# Patient Record
Sex: MALE | Race: WHITE | URBAN - METROPOLITAN AREA
[De-identification: names, ages, dates, MRNs, and addresses within clinical notes are randomized per-mention and may not be internally consistent; named-entity substitution may affect disease eponyms.]

---

## 2017-12-20 ENCOUNTER — OFFICE VISIT (OUTPATIENT)
Dept: AUDIOLOGY | Facility: CLINIC | Age: 52
End: 2017-12-20

## 2017-12-20 ENCOUNTER — OFFICE VISIT (OUTPATIENT)
Dept: OTOLARYNGOLOGY | Facility: CLINIC | Age: 52
End: 2017-12-20

## 2017-12-20 VITALS
BODY MASS INDEX: 28.62 KG/M2 | SYSTOLIC BLOOD PRESSURE: 112 MMHG | DIASTOLIC BLOOD PRESSURE: 70 MMHG | WEIGHT: 235 LBS | HEIGHT: 76 IN | TEMPERATURE: 97 F

## 2017-12-20 DIAGNOSIS — H93.12 TINNITUS OF LEFT EAR: Primary | ICD-10-CM

## 2017-12-20 DIAGNOSIS — H90.3 ASYMMETRIC SNHL (SENSORINEURAL HEARING LOSS): ICD-10-CM

## 2017-12-20 DIAGNOSIS — H93.19 TINNITUS, UNSPECIFIED LATERALITY: Primary | ICD-10-CM

## 2017-12-20 PROCEDURE — 92567 TYMPANOMETRY: CPT | Performed by: AUDIOLOGIST

## 2017-12-20 PROCEDURE — 92557 COMPREHENSIVE HEARING TEST: CPT | Performed by: AUDIOLOGIST

## 2017-12-20 PROCEDURE — 99244 OFF/OP CNSLTJ NEW/EST MOD 40: CPT | Performed by: OTOLARYNGOLOGY

## 2017-12-20 PROCEDURE — 99212 OFFICE O/P EST SF 10 MIN: CPT | Performed by: OTOLARYNGOLOGY

## 2017-12-20 NOTE — PROGRESS NOTES
AUDIOGRAM     Marion Gamez was referred for testing by Charly Mora for left sided tinnitus  2/22/1965  [id number]        Otoscopic inspection: right ear no cerumen; left ear no cerumen.        Tests/Procedures  Patient was tested via  stand

## 2017-12-20 NOTE — PATIENT INSTRUCTIONS
Choices for Hearing Protection  Earplugs and earmuffs that look like wireless headphones, have a noise reduction rating, or NRR. The [facility] tells you how many decibels your protection will block. Check the [facility] and decide what protection is right for you.  Ea

## 2017-12-20 NOTE — PROGRESS NOTES
Yg Lopez is a 47year old male. Patient presents with:  Ringing In Ear: ringing in the left ear for 3 months     HISTORY OF PRESENT ILLNESS  12/20/2017  Patient presents for evaluation of tinnitus This has been going on for months.  It is left an Negative Blurred vision and vision changes. Respiratory Negative Dyspnea and wheezing. Cardio Negative Chest pain, irregular heartbeat/palpitations and syncope. GI Negative Abdominal pain and diarrhea.    Endocrine Negative Cold intolerance and heat i prescriptions on file. ASSESSMENT AND PLAN    1. Tinnitus of left ear  The pathophysiology of tinnitus was discussed with the patient at length.  Potential causes include: Aspirin use, allergy, tmj, cervical spine issues, Ménière's disease, acoustic trauma

## 2018-01-05 ENCOUNTER — OFFICE VISIT (OUTPATIENT)
Dept: PODIATRY CLINIC | Facility: CLINIC | Age: 53
End: 2018-01-05

## 2018-01-05 DIAGNOSIS — G62.9 NEUROPATHY: Primary | ICD-10-CM

## 2018-01-05 PROCEDURE — 99212 OFFICE O/P EST SF 10 MIN: CPT | Performed by: PODIATRIST

## 2018-01-05 PROCEDURE — 99213 OFFICE O/P EST LOW 20 MIN: CPT | Performed by: PODIATRIST

## 2018-01-05 NOTE — PROGRESS NOTES
HPI:    Patient ID: Brandee Fenton is a 47year old male. HPI  This pleasant 58-year-old male presents as a new patient to me although he was a previous patient of Dr. Ana Gill. His primary complaint is numbness associated with both of his feet.   It understanding of my instructions and he was referred to [facility]. ASSESSMENT/PLAN:   Neuropathy  (primary encounter diagnosis)    No orders of the defined types were placed in this encounter.       Meds This Visit:  No prescriptions requested or or

## 2025-01-11 ENCOUNTER — APPOINTMENT (OUTPATIENT)
Dept: GENERAL RADIOLOGY | Facility: HOSPITAL | Age: 60
End: 2025-01-11
Attending: STUDENT IN AN ORGANIZED HEALTH CARE EDUCATION/TRAINING PROGRAM
Payer: COMMERCIAL

## 2025-01-11 ENCOUNTER — APPOINTMENT (OUTPATIENT)
Dept: GENERAL RADIOLOGY | Facility: HOSPITAL | Age: 60
End: 2025-01-11
Attending: EMERGENCY MEDICINE
Payer: COMMERCIAL

## 2025-01-11 ENCOUNTER — APPOINTMENT (OUTPATIENT)
Dept: CT IMAGING | Facility: HOSPITAL | Age: 60
End: 2025-01-11
Attending: STUDENT IN AN ORGANIZED HEALTH CARE EDUCATION/TRAINING PROGRAM
Payer: COMMERCIAL

## 2025-01-11 ENCOUNTER — HOSPITAL ENCOUNTER (OUTPATIENT)
Facility: HOSPITAL | Age: 60
Setting detail: OBSERVATION
Discharge: HOME | End: 2025-01-14
Attending: EMERGENCY MEDICINE | Admitting: INTERNAL MEDICINE
Payer: COMMERCIAL

## 2025-01-11 DIAGNOSIS — S82.91XA LEG FRACTURE, RIGHT, CLOSED, INITIAL ENCOUNTER: Primary | ICD-10-CM

## 2025-01-11 LAB
ANTIBODY SCREEN: NEGATIVE
APTT PPP: 25.7 SECONDS (ref 23–36)
INR BLD: 1 (ref 0.8–1.2)
PROTHROMBIN TIME: 13.8 SECONDS (ref 11.6–14.8)
RH BLOOD TYPE: POSITIVE
RH BLOOD TYPE: POSITIVE

## 2025-01-11 PROCEDURE — 73590 X-RAY EXAM OF LOWER LEG: CPT | Performed by: EMERGENCY MEDICINE

## 2025-01-11 PROCEDURE — 73610 X-RAY EXAM OF ANKLE: CPT | Performed by: EMERGENCY MEDICINE

## 2025-01-11 PROCEDURE — 99223 1ST HOSP IP/OBS HIGH 75: CPT | Performed by: INTERNAL MEDICINE

## 2025-01-11 PROCEDURE — 73700 CT LOWER EXTREMITY W/O DYE: CPT | Performed by: STUDENT IN AN ORGANIZED HEALTH CARE EDUCATION/TRAINING PROGRAM

## 2025-01-11 PROCEDURE — 73562 X-RAY EXAM OF KNEE 3: CPT | Performed by: STUDENT IN AN ORGANIZED HEALTH CARE EDUCATION/TRAINING PROGRAM

## 2025-01-11 RX ORDER — HYDROCODONE BITARTRATE AND ACETAMINOPHEN 5; 325 MG/1; MG/1
1 TABLET ORAL EVERY 4 HOURS PRN
Status: DISCONTINUED | OUTPATIENT
Start: 2025-01-11 | End: 2025-01-14

## 2025-01-11 RX ORDER — HEPARIN SODIUM 5000 [USP'U]/ML
5000 INJECTION, SOLUTION INTRAVENOUS; SUBCUTANEOUS EVERY 8 HOURS SCHEDULED
Status: DISCONTINUED | OUTPATIENT
Start: 2025-01-11 | End: 2025-01-13

## 2025-01-11 RX ORDER — ACETAMINOPHEN 500 MG
500 TABLET ORAL EVERY 4 HOURS PRN
Status: DISCONTINUED | OUTPATIENT
Start: 2025-01-11 | End: 2025-01-14

## 2025-01-11 RX ORDER — HYDROCODONE BITARTRATE AND ACETAMINOPHEN 5; 325 MG/1; MG/1
1 TABLET ORAL EVERY 6 HOURS PRN
Qty: 20 TABLET | Refills: 0 | Status: SHIPPED | OUTPATIENT
Start: 2025-01-11 | End: 2025-01-16

## 2025-01-11 RX ORDER — SODIUM CHLORIDE 9 MG/ML
INJECTION, SOLUTION INTRAVENOUS CONTINUOUS
Status: DISCONTINUED | OUTPATIENT
Start: 2025-01-11 | End: 2025-01-14

## 2025-01-11 RX ORDER — MORPHINE SULFATE 4 MG/ML
4 INJECTION, SOLUTION INTRAMUSCULAR; INTRAVENOUS EVERY 2 HOUR PRN
Status: DISCONTINUED | OUTPATIENT
Start: 2025-01-11 | End: 2025-01-14

## 2025-01-11 RX ORDER — MIDAZOLAM HYDROCHLORIDE 1 MG/ML
INJECTION INTRAMUSCULAR; INTRAVENOUS
Status: COMPLETED
Start: 2025-01-11 | End: 2025-01-11

## 2025-01-11 RX ORDER — HYDROCODONE BITARTRATE AND ACETAMINOPHEN 5; 325 MG/1; MG/1
2 TABLET ORAL EVERY 4 HOURS PRN
Status: DISCONTINUED | OUTPATIENT
Start: 2025-01-11 | End: 2025-01-14

## 2025-01-11 RX ORDER — MORPHINE SULFATE 2 MG/ML
1 INJECTION, SOLUTION INTRAMUSCULAR; INTRAVENOUS EVERY 2 HOUR PRN
Status: DISCONTINUED | OUTPATIENT
Start: 2025-01-11 | End: 2025-01-14

## 2025-01-11 RX ORDER — MIDAZOLAM HYDROCHLORIDE 1 MG/ML
4 INJECTION INTRAMUSCULAR; INTRAVENOUS ONCE
Status: COMPLETED | OUTPATIENT
Start: 2025-01-11 | End: 2025-01-11

## 2025-01-11 RX ORDER — MORPHINE SULFATE 2 MG/ML
2 INJECTION, SOLUTION INTRAMUSCULAR; INTRAVENOUS EVERY 2 HOUR PRN
Status: DISCONTINUED | OUTPATIENT
Start: 2025-01-11 | End: 2025-01-14

## 2025-01-11 RX ORDER — ACETAMINOPHEN 325 MG/1
650 TABLET ORAL EVERY 4 HOURS PRN
Status: DISCONTINUED | OUTPATIENT
Start: 2025-01-11 | End: 2025-01-14

## 2025-01-11 NOTE — PLAN OF CARE
Uneventful shift. Morphine 2mg IVP given for pain relief with positive results.    Problem: Patient Centered Care  Goal: Patient preferences are identified and integrated in the patient's plan of care  Description: Interventions:  - What would you like us to know as we care for you? I live at home with my family.  - Provide timely, complete, and accurate information to patient/family  - Incorporate patient and family knowledge, values, beliefs, and cultural backgrounds into the planning and delivery of care  - Encourage patient/family to participate in care and decision-making at the level they choose  - Honor patient and family perspectives and choices  Outcome: Progressing

## 2025-01-11 NOTE — ED QUICK NOTES
Orders for admission, patient is aware of plan and ready to go upstairs. Any questions, please call ED RN angel at extension [phone].     Patient Covid vaccination status: Fully vaccinated     COVID Test Ordered in ED: None    COVID Suspicion at Admission: N/A    Running Infusions:  None    Mental Status/LOC at time of transport: AXOX4    Other pertinent information:   CIWA score: N/A   NIH score:  N/A

## 2025-01-11 NOTE — ED INITIAL ASSESSMENT (HPI)
Patient to ED via EMS s/p fall on black ice while walking his dog on the sidewalk. +deformity to RLE/ankle, +CMS intact to RLE

## 2025-01-11 NOTE — ED PROVIDER NOTES
Patient Seen in: [facility] Emergency Department    History     Chief Complaint   Patient presents with   • Fall     Stated Complaint:     HPI    HPI: Shaw Altman is a 59 year old male who presents after an injury to the r lower leg  that occurred pta slipped on ice . Patient complains 10/10 pain.  Arrives by ems was given fentanyl pta  Pain better with rest, worse with movement.  no loss of strengths or sensation    History reviewed. No pertinent past medical history.    No past surgical history on file.         No family history on file.    Social History     Socioeconomic History   • Marital status:    Tobacco Use   • Smoking status: Never   • Smokeless tobacco: Never   Substance and Sexual Activity   • Alcohol use: Yes   • Drug use: No     Social Drivers of Health     Financial Resource Strain: Low Risk  (3/19/2024)    Received from [facility]    Overall Financial Resource Strain (CARDIA)    • Difficulty of Paying Living Expenses: Not hard at all   Food Insecurity: No Food Insecurity (3/19/2024)    Received from [facility]    Hunger Vital Sign    • Worried About Running Out of Food in the Last Year: Never true    • Ran Out of Food in the Last Year: Never true   Transportation Needs: No Transportation Needs (3/19/2024)    Received from [facility]    PRAPARE - Transportation    • Lack of Transportation (Medical): No    • Lack of Transportation (Non-Medical): No   Housing Stability: Low Risk  (3/18/2024)    Received from [facility]    Housing Stability Vital Sign    • Unable to Pay for Housing in the Last Year: No    • Number of Places Lived in the Last Year: 1    • Unstable Housing in the Last Year: No       Review of Systems    Positive for stated complaint:   Other systems are as noted in HPI.  Constitutional and vital signs reviewed.      All other systems reviewed and negative except as noted  above.    PSFH elements reviewed from today and agreed except as otherwise stated in HPI.    Physical Exam     ED Triage Vitals   BP 01/11/25 0826 (!) 197/111   Pulse 01/11/25 0822 58   Resp 01/11/25 0822 16   Temp 01/11/25 0822 97 °F (36.1 °C)   Temp src 01/11/25 0822 Temporal   SpO2 01/11/25 0822 98 %   O2 Device 01/11/25 0822 None (Room air)       Current:BP (!) 143/94   Pulse 53   Temp 97 °F (36.1 °C) (Temporal)   Resp 15   Ht 193 cm (6' 4\")   Wt 108.9 kg   SpO2 95%   BMI 29.21 kg/m²         Physical Exam      MENTAL STATUS: Alert, oriented, and cooperative. No focal deficit  HEAD: Atraumatic  NECK: Supple, full range of motion without pain or paresthesias  EXTREMITIES: r lower leg with distal lower leg deformity with lat angulation, distal nvs intact  .  2+ distal pulses.  NEURO:Sensation to touch is intact.  SKIN: No open wounds, no rashes.  PSYCH: Normal affect. Calm and cooperative.    Differential diagnosis to include fracture vs. Strain/sprain vs. contusion        ED Course   Labs Reviewed - No data to display  A ocl with stirrups splint was applied to the r lower leg.  After application of the splint I returned and re-examined the patient.  The splint was adequately immobilizing the joint and distal to the splint the patient's circulation and sensation was intact.    [facility]     Radiology:I reviewed xray noted ditstal tib displaced fx and prox fib fx    @No results found.    Medical Decision Making    Patient unable to tolerate pain or ambulate with crutches will admit, on call ortho consulted    Problems Addressed:  Leg fracture, right, closed, initial encounter: acute illness or injury    Amount and/or Complexity of Data Reviewed  Radiology: ordered and independent interpretation performed. Decision-making details documented in ED Course.  Discussion of management or test interpretation with external provider(s): Tylenol, motrin recommended.      Risk  OTC drugs.  Prescription drug  management.            Disposition and Plan     Clinical Impression:  1. Leg fracture, right, closed, initial encounter        Disposition:  Discharge    Follow-up:  Orlando Sevilla MD  [address] [address]  [phone]    Follow up        Medications Prescribed:  Current Discharge Medication List        START taking these medications    Details   HYDROcodone-acetaminophen 5-325 MG Oral Tab Take 1 tablet by mouth every 6 (six) hours as needed.  Qty: 20 tablet, Refills: 0    Associated Diagnoses: Leg fracture, right, closed, initial encounter

## 2025-01-11 NOTE — PLAN OF CARE
Shaw is A&Ox4 on room air . Splint in place to right lower extremity. Elevated up with pillows. On a general diet. Voiding via urinal. Plan is for possible surgery Monday. Ortho on consult.  Problem: Patient Centered Care  Goal: Patient preferences are identified and integrated in the patient's plan of care  Description: Interventions:  - What would you like us to know as we care for you?   - Provide timely, complete, and accurate information to patient/family  - Incorporate patient and family knowledge, values, beliefs, and cultural backgrounds into the planning and delivery of care  - Encourage patient/family to participate in care and decision-making at the level they choose  - Honor patient and family perspectives and choices  Outcome: Progressing

## 2025-01-11 NOTE — H&P
[facility]  part of [facility]    History and Physical     Shaw Altman Patient Status:  Emergency    1965 MRN [id number]   Location [facility] EMERGENCY DEPARTMENT Attending Salvador Medina MD   Hosp Day # 0 PCP Silvestre Lopez MD       History of Present Illness: Shaw Altman is a 59 year old male presented ot the ER after he slipped on ice. He denied any loss of consciousness or injury to any other bony prominence.   He is currently waiting to speak with ortho.   He denied any fever, chills, sob, n/v/d or any other complaints.     Past Medical History:History reviewed. No pertinent past medical history.     Past Surgical History: No past surgical history on file.    Social History:  reports that he has never smoked. He has never used smokeless tobacco. He reports current alcohol use. He reports that he does not use drugs.    Family History: No family history on file.    Allergies: Allergies[1]    Medications:  Medications Ordered Prior to Encounter[2]    Review of Systems:   A comprehensive 14 point review of systems was completed.    Pertinent positives and negatives noted in the HPI.    Physical Exam:    BP (!) 138/95   Pulse 53   Temp 97 °F (36.1 °C) (Temporal)   Resp 16   Ht 6' 4\" (1.93 m)   Wt 240 lb (108.9 kg)   SpO2 95%   BMI 29.21 kg/m²   General: No acute distress. Alert and oriented x 3.  Respiratory: Clear to auscultation bilaterally. No wheezes. No rhonchi.  Cardiovascular: S1, S2. Regular rate and rhythm. No murmurs, no rubs or gallops. Equal pulses.   Abdomen: Soft, nontender, nondistended.  Positive bowel sounds. No rebound, guarding or organomegaly.  Neurologic: No focal neurological deficits. CNII-XII grossly intact.  Extremities: RLE splint in place.      Diagnostic Data:      Labs:  No results for input(s): \"WBC\", \"HGB\", \"MCV\", \"PLT\", \"BAND\", \"INR\" in the last 168 hours.    Invalid input(s): \"LYM#\", \"MONO#\", \"BASOS#\", \"EOSIN#\"    No results  for input(s): \"GLU\", \"BUN\", \"CREATSERUM\", \"GFRAA\", \"GFRNAA\", \"CA\", \"ALB\", \"NA\", \"K\", \"CL\", \"CO2\", \"ALKPHO\", \"AST\", \"ALT\", \"BILT\", \"TP\" in the last 168 hours.    CrCl cannot be calculated (No successful lab value found.).    No results for input(s): \"PTP\", \"INR\" in the last 168 hours.    No results for input(s): \"TROP\", \"CK\" in the last 168 hours.    Imaging: Imaging data reviewed in Epic.      ASSESSMENT / PLAN:     R tibial fracture  Imaging reviewed  Ortho on consult  Appreciate recs  DVT ppx, IVF, abx and pain meds per ortho  PT/OT once cleared by ortho      Quality:  DVT Prophylaxis: Heparin s/c  CODE status:     Plan of care discussed with ED physician    Balta Gonzalez MD  1/11/2025                         The 21st Century Cures Act makes medical notes like these available to patients in the interest of transparency. Please be advised this is a medical document. Medical documents are intended to carry relevant information, facts as evident, and the clinical opinion of the practitioner. The medical note is intended as peer to peer communication and may appear blunt or direct. It is written in medical language and may contain abbreviations or verbiage that are unfamiliar.          [1]   Allergies  Allergen Reactions    Erythromycin     Tetanus Toxoids     Tetracycline    [2]   No current facility-administered medications on file prior to encounter.     No current outpatient medications on file prior to encounter.

## 2025-01-12 LAB
ANION GAP SERPL CALC-SCNC: 7 MMOL/L (ref 0–18)
BASOPHILS # BLD AUTO: 0.02 X10(3) UL (ref 0–0.2)
BASOPHILS NFR BLD AUTO: 0.3 %
BUN BLD-MCNC: 15 MG/DL (ref 9–23)
BUN/CREAT SERPL: 14.9 (ref 10–20)
CALCIUM BLD-MCNC: 9.3 MG/DL (ref 8.7–10.4)
CHLORIDE SERPL-SCNC: 104 MMOL/L (ref 98–112)
CO2 SERPL-SCNC: 28 MMOL/L (ref 21–32)
CREAT BLD-MCNC: 1.01 MG/DL
DEPRECATED RDW RBC AUTO: 41.6 FL (ref 35.1–46.3)
EGFRCR SERPLBLD CKD-EPI 2021: 85 ML/MIN/1.73M2 (ref 60–?)
EOSINOPHIL # BLD AUTO: 0.03 X10(3) UL (ref 0–0.7)
EOSINOPHIL NFR BLD AUTO: 0.4 %
ERYTHROCYTE [DISTWIDTH] IN BLOOD BY AUTOMATED COUNT: 13.2 % (ref 11–15)
GLUCOSE BLD-MCNC: 109 MG/DL (ref 70–99)
HCT VFR BLD AUTO: 42.6 %
HGB BLD-MCNC: 14.3 G/DL
IMM GRANULOCYTES # BLD AUTO: 0.02 X10(3) UL (ref 0–1)
IMM GRANULOCYTES NFR BLD: 0.3 %
LYMPHOCYTES # BLD AUTO: 1.5 X10(3) UL (ref 1–4)
LYMPHOCYTES NFR BLD AUTO: 19.9 %
MAGNESIUM SERPL-MCNC: 2.1 MG/DL (ref 1.6–2.6)
MCH RBC QN AUTO: 29.1 PG (ref 26–34)
MCHC RBC AUTO-ENTMCNC: 33.6 G/DL (ref 31–37)
MCV RBC AUTO: 86.8 FL
MONOCYTES # BLD AUTO: 0.95 X10(3) UL (ref 0.1–1)
MONOCYTES NFR BLD AUTO: 12.6 %
NEUTROPHILS # BLD AUTO: 5.03 X10 (3) UL (ref 1.5–7.7)
NEUTROPHILS # BLD AUTO: 5.03 X10(3) UL (ref 1.5–7.7)
NEUTROPHILS NFR BLD AUTO: 66.5 %
OSMOLALITY SERPL CALC.SUM OF ELEC: 289 MOSM/KG (ref 275–295)
PLATELET # BLD AUTO: 193 10(3)UL (ref 150–450)
POTASSIUM SERPL-SCNC: 4.3 MMOL/L (ref 3.5–5.1)
RBC # BLD AUTO: 4.91 X10(6)UL
SODIUM SERPL-SCNC: 139 MMOL/L (ref 136–145)
WBC # BLD AUTO: 7.6 X10(3) UL (ref 4–11)

## 2025-01-12 PROCEDURE — 99233 SBSQ HOSP IP/OBS HIGH 50: CPT | Performed by: INTERNAL MEDICINE

## 2025-01-12 PROCEDURE — 99497 ADVNCD CARE PLAN 30 MIN: CPT | Performed by: INTERNAL MEDICINE

## 2025-01-12 NOTE — PLAN OF CARE
Patient alert and oriented. R tibia/fibula fx, splint in place. Elevated. Bed rest. Ortho consulted. Norco provided as needed for pain.     Plan is surgery tomorrow. NPO at 0500. Hold AM heparin.    Problem: Patient Centered Care  Goal: Patient preferences are identified and integrated in the patient's plan of care  Description: Interventions:  - What would you like us to know as we care for you?   - Provide timely, complete, and accurate information to patient/family  - Incorporate patient and family knowledge, values, beliefs, and cultural backgrounds into the planning and delivery of care  - Encourage patient/family to participate in care and decision-making at the level they choose  - Honor patient and family perspectives and choices  Outcome: Progressing     Problem: Patient/Family Goals  Goal: Patient/Family Long Term Goal  Description: Patient's Long Term Goal: discharge     Interventions:  - follow md orders  -monitor labs  -discharge planning  -update pt and family on plan of care  - See additional Care Plan goals for specific interventions  Outcome: Progressing  Goal: Patient/Family Short Term Goal  Description: Patient's Short Term Goal: pain management    Interventions:   - follow md orders  -take pain medication as needed  -non-pharmacologic therapy  -PT/OT  - See additional Care Plan goals for specific interventions  Outcome: Progressing     Problem: PAIN - ADULT  Goal: Verbalizes/displays adequate comfort level or patient's stated pain goal  Description: INTERVENTIONS:  - Encourage pt to monitor pain and request assistance  - Assess pain using appropriate pain scale  - Administer analgesics based on type and severity of pain and evaluate response  - Implement non-pharmacological measures as appropriate and evaluate response  - Consider cultural and social influences on pain and pain management  - Manage/alleviate anxiety  - Utilize distraction and/or relaxation techniques  - Monitor for opioid side  effects  - Notify MD/LIP if interventions unsuccessful or patient reports new pain  - Anticipate increased pain with activity and pre-medicate as appropriate  Outcome: Progressing     Problem: RISK FOR INFECTION - ADULT  Goal: Absence of fever/infection during anticipated neutropenic period  Description: INTERVENTIONS  - Monitor WBC  - Administer growth factors as ordered  - Implement neutropenic guidelines  Outcome: Progressing     Problem: SAFETY ADULT - FALL  Goal: Free from fall injury  Description: INTERVENTIONS:  - Assess pt frequently for physical needs  - Identify cognitive and physical deficits and behaviors that affect risk of falls.  - [place] fall precautions as indicated by assessment.  - Educate pt/family on patient safety including physical limitations  - Instruct pt to call for assistance with activity based on assessment  - Modify environment to reduce risk of injury  - Provide assistive devices as appropriate  - Consider OT/PT consult to assist with strengthening/mobility  - Encourage toileting schedule  Outcome: Progressing     Problem: DISCHARGE PLANNING  Goal: Discharge to home or other facility with appropriate resources  Description: INTERVENTIONS:  - Identify barriers to discharge w/pt and caregiver  - Include patient/family/discharge partner in discharge planning  - Arrange for needed discharge resources and transportation as appropriate  - Identify discharge learning needs (meds, wound care, etc)  - Arrange for interpreters to assist at discharge as needed  - Consider post-discharge preferences of patient/family/discharge partner  - Complete POLST form as appropriate  - Assess patient's ability to be responsible for managing their own health  - Refer to Case Management Department for coordinating discharge planning if the patient needs post-hospital services based on physician/LIP order or complex needs related to functional status, cognitive ability or social support system  Outcome:  Progressing

## 2025-01-12 NOTE — OCCUPATIONAL THERAPY NOTE
OT order received, chart reviewed. Per chart, patient pending ortho consult for possible sx Monday 1/13. Will hold evaluation at this time pending updated POC.    Katelyn Thompson OTR/L  [facility]  #[phone]

## 2025-01-12 NOTE — CONSULTS
[facility]   ORTHOPAEDIC CONSULT NOTE    HOSPITAL: [facility]    CONSULT REQUEST BY: Dr. Gonzalez    CHIEF COMPLAINT/REASON FOR VISIT  Right lower leg pain     HISTORY OF PRESENT ILLNESS  Shaw Altman is a 59 year old male, healthy, who presents with right lower leg pain after a mechanical slip and fall on ice on 1/11/2025.. Taken to the ER at [facility], where imaging demonstrated a right tibia / fibula fracture, long leg splint applied, and an ortho consult was requested.    He experienced immediate 10/10, sharp pain in the mid shin, radiating into the ankle. No associated head trauma or trauma elsewhere. Denies pain elsewhere. No associated new onset numbness/tingling in the affected extremity.    At baseline he is a community ambulator and uses no devices to ambulate. No antecedent lower leg pain.     Works for [employer] in [employer].    Lives with wife in [place].     Review of Systems: Complete 10 point review of systems was performed and negative except for above in HPI.     PMH  Past Medical History:    Back problem        PSH  History reviewed. No pertinent surgical history.    FAMILY HISTORY  History reviewed. No pertinent family history.     SOCIAL HISTORY  Social History     Socioeconomic History    Marital status:      Spouse name: Not on file    Number of children: Not on file    Years of education: Not on file    Highest education level: Not on file   Occupational History    Not on file   Tobacco Use    Smoking status: Never    Smokeless tobacco: Never   Substance and Sexual Activity    Alcohol use: Yes    Drug use: No    Sexual activity: Not on file   Other Topics Concern    Not on file   Social History Narrative    Not on file     Social Drivers of Health     Financial Resource Strain: Low Risk  (3/19/2024)    Received from [facility]    Overall Financial Resource Strain (CARDIA)     Difficulty of Paying Living Expenses: Not hard at all   Food  Insecurity: No Food Insecurity (1/11/2025)    Food Insecurity     Food Insecurity: Never true   Transportation Needs: No Transportation Needs (1/11/2025)    Transportation Needs     Lack of Transportation: No     Car Seat: Not on file   Physical Activity: Not on file   Stress: Not on file   Social Connections: Not on file   Housing Stability: Low Risk  (1/11/2025)    Housing Stability     Housing Instability: No     Housing Instability Emergency: Not on file     Crib or Bassinette: Not on file       MEDS  Medications Ordered Prior to Encounter[1]    ALLERGIES  Allergies[2]     PHYSICAL EXAM  BMI:  Body mass index is 29.22 kg/m².  Constitutional:  The patient is alert and awake in no apparent distress.  Psychiatric: Affect appears normal. Conversational.  Lymphatic: There is no clear lymphedema in the lower extremity  Lungs:  The patient's breathing is unlabored.  Heart: Regular heart rate  Abdomen Nondistended, Nontender    Skin:  Splint over RLE uncovered anteriorly. There are no prior incisions, scars, open wounds or lesions over the right lower leg from the visualized windows through the splint wrap.     Musculoskeletal:    Moderate soft tissue swelling, compartments soft and compressible throughout the lower leg.  There is severe tenderness to palpation over the mid tibia.    Pain in the tibia is not reproduced or significant with passive stretch of the toes/ankle..     Neuro: The patient has 5/5 strength in ipsilateral EHL, and FHL.    Sensation is intact to light touch in the ipsilateral superficial peroneal, deep peroneal,and tibial nerve distributions.    Vascular exam: Toes are warm and well perfused. Capillary refill 2s.     IMAGING  I personally reviewed and interpreted the patient's imaging. Review of the knee, tibia and ankle x-rays and CT of the tibia reveal a moderately displaced spiral distal 1/3 tibial shaft fracture with mild comminution with associated proximal 1/3 spiral comminuted fibular shaft  fracture, and distally, a nondisplaced posterior malleolar distal tibia fracture, and two small avulsions of the anterolateral distal tibia ([place] fragment), and anterior fibula ([place] fragment) warranting syndesmotic stability evaluation.       ASSESSMENT:  59 year old healthy man, who slipped on ice, and presents with a displaced tibial shaft/fibular shaft fractures with associated nondisplaced posterior malleolar fracture and small avulsions of the AITFL at the ankle syndesmosis.     PLAN:  I discussed the diagnosis of the orthopaedic injury with the patient  as well as the treatment options of nonoperative vs operative management with the latter being recommended in his case. Together we decided it is best to proceed with operative stabilization of his tibial shaft fracture, posterior malleolar ankle fracture, with intraoperative stress evaluation of the ankle syndesmosis and if noted to be unstable, proceed with syndesmotic stabiliation, with the goals of pain control, increased probability of fracture healing in a more anatomic position, and addressing potentially unstable ankle injury to minimize risk of late syndesmotic failure or ankle instability and higher risks of post-traumatic ankle osteoarthritis. Risks, benefits, and alternatives including bleeding, infection, damage to surrounding structures including vessels, nerves, ligaments, and tendons, fracture, dislocation, fracture nonunion, malunion, leg length discrepancy, asymmetric leg rotation, anterior knee pain from suprapatellar nailing, and medical and anesthetic complications were discussed with the patient. Informed consent obtained.   Will plan for operative intervention on 1/13 if medically optimized.   NPO / IVF 1/13 after breakfast   Weight bearing status: Non weight bearing right LE. Elevate extremity on 3 pillows to minimize swelling  DVT chemoprophylaxis heparin subcutaneous or lovenox 40 daily perioperatively - does not need  to be held  Multimodal pain control  Medicine consult requested for perioperative optimization. Follow up recommendations.   Dispo: pending operative management.         Omar Behery, MD  [facility]          [1]   No current facility-administered medications on file prior to encounter.     No current outpatient medications on file prior to encounter.   [2]   Allergies  Allergen Reactions    Erythromycin     Tetanus Toxoids     Tetracycline

## 2025-01-12 NOTE — PHYSICAL THERAPY NOTE
PT orders received and chart reviewed. Patient admitted following a fall - imaging significant for \"Comminuted displaced distal tibial fracture. Transverse nondisplaced fracture of the posterior 3rd of tibial plafond extending into syndesmosis. Fractures of anterolateral tibia , adjacent fibula at anterior tibiofibular ligament attachments. Comminuted displaced proximal fibular diaphyseal fracture.\" Per nursing notes, plan for possible surgery on Monday, 1/13/25.     Jenna Haase, PT, DPT  [facility]  Ext: [phone]

## 2025-01-12 NOTE — PLAN OF CARE
Alert and oriented x4. Admitted for right tibia/fibula fracture. Leg elevated. Voiding in urinal. Pain control. Awaiting ortho consult, possible surgery on Monday.  Problem: Patient Centered Care  Goal: Patient preferences are identified and integrated in the patient's plan of care  Description: Interventions:  - What would you like us to know as we care for you? Home with family  - Provide timely, complete, and accurate information to patient/family  - Incorporate patient and family knowledge, values, beliefs, and cultural backgrounds into the planning and delivery of care  - Encourage patient/family to participate in care and decision-making at the level they choose  - Honor patient and family perspectives and choices  Outcome: Progressing     Problem: Patient/Family Goals  Goal: Patient/Family Long Term Goal  Description: Patient's Long Term Goal: pain management    Interventions:  - pain administration  - See additional Care Plan goals for specific interventions  Outcome: Progressing  Goal: Patient/Family Short Term Goal  Description: Patient's Short Term Goal: safe ambulation    Interventions:   -  calling for assistance out of bed  - See additional Care Plan goals for specific interventions  Outcome: Progressing     Problem: PAIN - ADULT  Goal: Verbalizes/displays adequate comfort level or patient's stated pain goal  Description: INTERVENTIONS:  - Encourage pt to monitor pain and request assistance  - Assess pain using appropriate pain scale  - Administer analgesics based on type and severity of pain and evaluate response  - Implement non-pharmacological measures as appropriate and evaluate response  - Consider cultural and social influences on pain and pain management  - Manage/alleviate anxiety  - Utilize distraction and/or relaxation techniques  - Monitor for opioid side effects  - Notify MD/LIP if interventions unsuccessful or patient reports new pain  - Anticipate increased pain with activity and  pre-medicate as appropriate  Outcome: Progressing     Problem: RISK FOR INFECTION - ADULT  Goal: Absence of fever/infection during anticipated neutropenic period  Description: INTERVENTIONS  - Monitor WBC  - Administer growth factors as ordered  - Implement neutropenic guidelines  Outcome: Progressing     Problem: SAFETY ADULT - FALL  Goal: Free from fall injury  Description: INTERVENTIONS:  - Assess pt frequently for physical needs  - Identify cognitive and physical deficits and behaviors that affect risk of falls.  - [place] fall precautions as indicated by assessment.  - Educate pt/family on patient safety including physical limitations  - Instruct pt to call for assistance with activity based on assessment  - Modify environment to reduce risk of injury  - Provide assistive devices as appropriate  - Consider OT/PT consult to assist with strengthening/mobility  - Encourage toileting schedule  Outcome: Progressing     Problem: DISCHARGE PLANNING  Goal: Discharge to home or other facility with appropriate resources  Description: INTERVENTIONS:  - Identify barriers to discharge w/pt and caregiver  - Include patient/family/discharge partner in discharge planning  - Arrange for needed discharge resources and transportation as appropriate  - Identify discharge learning needs (meds, wound care, etc)  - Arrange for interpreters to assist at discharge as needed  - Consider post-discharge preferences of patient/family/discharge partner  - Complete POLST form as appropriate  - Assess patient's ability to be responsible for managing their own health  - Refer to Case Management Department for coordinating discharge planning if the patient needs post-hospital services based on physician/LIP order or complex needs related to functional status, cognitive ability or social support system  Outcome: Progressing

## 2025-01-12 NOTE — DISCHARGE INSTRUCTIONS
DISCHARGE INSTRUCTIONS:  PLACE ICE TO SURGICAL AREA 20-30 MINUTES ON/ 20-30 MINUTES OFF. THIS IS TO HELP WITH PAIN & SWELLING.    TAKE YOUR MEDICATIONS BELOW AS PRESCRIBED BY YOUR DOCTOR. THESE HAVE BEEN SENT TO YOUR PHARMACY.    DO NOT BEAR WEIGHT  ON THE OPERATIVE EXTREMITY. YOU CAN USE A KNEE SCOOTER AFTER KNEE INCISION HEALS IN 3 WEEKS     CALL TO CONFIRM YOUR FOLLOW UP APPOINTMENT WITH DR. BEHERY TO BE SEEN IN 2-3 WEEKS POSTOP. [phone]    MEDICATIONS:    POST OP MEDICATION REGIMEN              MULTI-MODAL   PAIN REGIMEN       DRUG   FOR   FREQUENCY & DURATION   QTY   NOTES     WEANING  TIPS       Gabapentin 100mg   Nerve pain   Take 2 tabs every 8 hours for 14 days    90   No refills You may discontinue this medication prior to 14 days if you do not tolerate it.        Tylenol 500mg     Mild pain   Take 1 tab every 6 hours     90   Can purchase over-the-counter    Stop using medication if no longer having pain.      Tramadol 50mg     Moderate pain   Take 1-2 tabs every 6 hours only as needed   45 May prescribe a refill, but ideally, this should be tapered off after surgery Stop using this medication 2nd   As pain decreases over time, increase the interval between doses (6 hours to 8, then 12, then 24) to taper off the medication.      Meloxicam 15mg     Inflammation   Take 1 tab daily for 30 days     30   May refill if needed beyond 30 days   Recommend completing the 30 day supply.           BREAKTHROUGH PAIN         Oxycodone 5mg       Severe pain     Take 1 tabs every 6 hours only as needed for severe pain       40   Take at least 1 hr apart from Tramadol.    Ideally, no refill. The goal is to taper off this medication as soon as possible, as it can be addictive and have side effects.    Stop using this medication 1st if no longer having severe pain.         BLOOD THINNER     Aspirin 81mg   Blood clot prevention   Take 1 tab twice daily for 30 days     60     No refills   Complete the entire course of  your blood thinner.                      STOOL SOFTENER     Sennokot 8.6/50mg   Constipation   Take 1 tab twice daily  while on opioids     60 Refer to discharge instructions if constipation persists even after taking this medication   Stop taking if having diarrhea or loose stools.           ANTI-NAUSEA   Ondansetron 4mg   Nausea   Take 1 tab every 8 hours as needed if nauseous     20   No Refill      ANTI-ACID REFLUX / GASTRITIS   Pantoprazole 40mg   Acid reflux, gastric ulcer prophylaxis   Take 1 tab every day along with Meloxicam     60   May refill if Meloxicam refilled          DRESSING:    KEEP YOUR SPLINT CLEAN AND DRY AT ALL TIMES. DO NOT GET IT WET OR LOOSEN IT. YOU CAN SHOWER IF YOU ARE ABLE TO DO SO SAFELY WITH A COMPLETELY OCCLUSIVE CAST BAG OVER THE LEFT LEG AND NOT PUTTING YOUR WEIGHT ON THE LEG.     IF DRAINAGE IS PRESENT AT ANYTIME FROM YOUR DRESSING OR FROM YOUR INCISION CALL YOUR DOCTOR / SURGEON AS SOON AS POSSIBLE.      REASONS TO CALL YOUR DOCTOR:  TEMPERATURE .0 OR MORE  PERSISTANT NAUSEA OR VOMITTING - ESPECIALLY IF YOU ARE UNABLE TO EAT OR DRINK.  INCREASED LEVEL OF PAIN OR PAIN WHICH IS NOT CONTROLLED BY YOUR PAIN MEDICINE.  PERSISTENT DRAINAGE AT ANYTIME FROM YOUR SURGICAL AREA / INCISION.  PAIN, TENDERNESS OR SUDDEN SWELLING IN YOUR CALF REGION OF THE LOWER EXTREMITIES - THIS IS A POSSIBLE SIGN OF A BLOOD CLOT.  ANY CHANGES IN SENSATION IN YOUR BODY IN THE AREA OF YOUR SURGERY = NUMBNESS OR TINGLING.  ANY CHANGES IN CIRCULATION IN YOUR BODY IN THE AREA OF YOUR SURGERY = CHANGES  IN COLOR EITHER DARKER OR VERY PALE; OR  IF AREA FEELS COLD TO THE TOUCH AS COMPARED TO OTHER AREAS.  ANY CHANGES IN FUNCTION IN YOUR BODY IN THE AREA OF YOUR SURGERY = CHANGE IN MOVEMENT - UNABLE TO MOVE OR WIGGLE FINGERS, TOES OR FOOT OR ANY OTHER CHANGES IN NORMAL BODY FUNCTIONING.  FOR ANY OF THE ABOVE OR ANY OTHER QUESTIONS OR CONCERNS CALL YOUR DOCTOR/ SURGEON AS SOON AS POSSIBLE.    Omar Behery, MD  MPH  Orthopaedic Surgeon, Adult Hip and Knee Reconstruction  [facility] at [address], [facility] [address]  [place] [address]  Office: (P) [phone] (F) [phone]  Adminstrative Assistant: Liz Steward

## 2025-01-12 NOTE — PROGRESS NOTES
[facility]  part of [facility] Hospitalist Progress Note     Shaw Altman Patient Status:  Observation    1965 MRN [id number]   Location [facility] 4W/SW/SE Attending Balta Gonzalez MD   Hosp Day # 0 PCP Silvestre Lopez MD     Subjective:     Patient resting comfortably and in NAD> Denied any active complaints.   Pain appears to be better controlled  Plan for surgery in a.m.      Objective:    Review of Systems:   ROS completed; pertinent positive and negatives stated in subjective.      Vital signs:  Temp:  [97.8 °F (36.6 °C)-98.9 °F (37.2 °C)] 97.8 °F (36.6 °C)  Pulse:  [54-60] 54  Resp:  [15-16] 15  BP: (121-158)/(77-92) 121/77  SpO2:  [92 %-95 %] 92 %      Physical Exam:    Gen: NAD AO x3  Chest: good air entry CTABL  CVS: normal s1 and s2 RR  Abd: NABS soft NT ND  Neuro: CN 2-12 grossly intact  Ext: RLE splint       Diagnostic Data:    Labs:  Recent Labs   Lab 25  0501   WBC  --  7.6   HGB  --  14.3   MCV  --  86.8   PLT  --  193.0   INR 1.00  --        Recent Labs   Lab 25  0501   *   BUN 15   CREATSERUM 1.01   CA 9.3      K 4.3      CO2 28.0       Estimated Creatinine Clearance: 95.5 mL/min (based on SCr of 1.01 mg/dL).    Recent Labs   Lab 25   PTP 13.8   INR 1.00              Imaging: Imaging data reviewed in Epic.    Medications:    heparin  5,000 Units Subcutaneous Q8H [place]       Assessment & Plan:     R tibial fracture  Imaging reviewed  Ortho on consult  Operative management  NPO after breakfast 25  NWB RLE  Heparin s/c  PRN pain control  DVT ppx, IVF, abx and pain meds per ortho  PT/OT once cleared by ortho  Advance care planning  Full code  ~31 minutes      Plan of care discussed with patient or family at bedside.      Supplementary Documentation:     Quality:  DVT Prophylaxis: Heparin s/c  CODE status: Full       Estimated date of discharge: TBD  Discharge is dependent on: clinical stability  At  this point Mr. Altman is expected to be discharge to: home                   Balta Gonzalez MD  Hospitalist    MDM: High, I personally reviewed the available laboratories, imaging including XR. I discussed the case with RN. I ordered laboratories, studies including AM labs.  Medical decision making high, risk is high.       The 21st Century Cures Act makes medical notes like these available to patients in the interest of transparency. Please be advised this is a medical document. Medical documents are intended to carry relevant information, facts as evident, and the clinical opinion of the practitioner. The medical note is intended as peer to peer communication and may appear blunt or direct. It is written in medical language and may contain abbreviations or verbiage that are unfamiliar.

## 2025-01-13 ENCOUNTER — APPOINTMENT (OUTPATIENT)
Dept: GENERAL RADIOLOGY | Facility: HOSPITAL | Age: 60
End: 2025-01-13
Attending: STUDENT IN AN ORGANIZED HEALTH CARE EDUCATION/TRAINING PROGRAM
Payer: COMMERCIAL

## 2025-01-13 ENCOUNTER — ANESTHESIA (OUTPATIENT)
Dept: SURGERY | Facility: HOSPITAL | Age: 60
End: 2025-01-13
Payer: COMMERCIAL

## 2025-01-13 ENCOUNTER — ANESTHESIA EVENT (OUTPATIENT)
Dept: SURGERY | Facility: HOSPITAL | Age: 60
End: 2025-01-13
Payer: COMMERCIAL

## 2025-01-13 LAB
ALBUMIN SERPL-MCNC: 4 G/DL (ref 3.2–4.8)
ALBUMIN/GLOB SERPL: 1.6 {RATIO} (ref 1–2)
ALP LIVER SERPL-CCNC: 95 U/L
ALT SERPL-CCNC: 16 U/L
ANION GAP SERPL CALC-SCNC: 8 MMOL/L (ref 0–18)
AST SERPL-CCNC: 18 U/L (ref ?–34)
BASOPHILS # BLD AUTO: 0.02 X10(3) UL (ref 0–0.2)
BASOPHILS NFR BLD AUTO: 0.3 %
BILIRUB SERPL-MCNC: 0.7 MG/DL (ref 0.2–1.1)
BUN BLD-MCNC: 12 MG/DL (ref 9–23)
BUN/CREAT SERPL: 12.1 (ref 10–20)
CALCIUM BLD-MCNC: 9 MG/DL (ref 8.7–10.4)
CHLORIDE SERPL-SCNC: 104 MMOL/L (ref 98–112)
CO2 SERPL-SCNC: 28 MMOL/L (ref 21–32)
CREAT BLD-MCNC: 0.99 MG/DL
DEPRECATED RDW RBC AUTO: 41 FL (ref 35.1–46.3)
EGFRCR SERPLBLD CKD-EPI 2021: 87 ML/MIN/1.73M2 (ref 60–?)
EOSINOPHIL # BLD AUTO: 0.09 X10(3) UL (ref 0–0.7)
EOSINOPHIL NFR BLD AUTO: 1.4 %
ERYTHROCYTE [DISTWIDTH] IN BLOOD BY AUTOMATED COUNT: 12.9 % (ref 11–15)
GLOBULIN PLAS-MCNC: 2.5 G/DL (ref 2–3.5)
GLUCOSE BLD-MCNC: 107 MG/DL (ref 70–99)
HCT VFR BLD AUTO: 40.3 %
HGB BLD-MCNC: 13.7 G/DL
IMM GRANULOCYTES # BLD AUTO: 0.02 X10(3) UL (ref 0–1)
IMM GRANULOCYTES NFR BLD: 0.3 %
LYMPHOCYTES # BLD AUTO: 1.73 X10(3) UL (ref 1–4)
LYMPHOCYTES NFR BLD AUTO: 27 %
MCH RBC QN AUTO: 29.6 PG (ref 26–34)
MCHC RBC AUTO-ENTMCNC: 34 G/DL (ref 31–37)
MCV RBC AUTO: 87 FL
MONOCYTES # BLD AUTO: 0.93 X10(3) UL (ref 0.1–1)
MONOCYTES NFR BLD AUTO: 14.5 %
NEUTROPHILS # BLD AUTO: 3.61 X10 (3) UL (ref 1.5–7.7)
NEUTROPHILS # BLD AUTO: 3.61 X10(3) UL (ref 1.5–7.7)
NEUTROPHILS NFR BLD AUTO: 56.5 %
OSMOLALITY SERPL CALC.SUM OF ELEC: 290 MOSM/KG (ref 275–295)
PLATELET # BLD AUTO: 176 10(3)UL (ref 150–450)
POTASSIUM SERPL-SCNC: 4 MMOL/L (ref 3.5–5.1)
PROT SERPL-MCNC: 6.5 G/DL (ref 5.7–8.2)
RBC # BLD AUTO: 4.63 X10(6)UL
SODIUM SERPL-SCNC: 140 MMOL/L (ref 136–145)
WBC # BLD AUTO: 6.4 X10(3) UL (ref 4–11)

## 2025-01-13 PROCEDURE — 73590 X-RAY EXAM OF LOWER LEG: CPT | Performed by: STUDENT IN AN ORGANIZED HEALTH CARE EDUCATION/TRAINING PROGRAM

## 2025-01-13 PROCEDURE — 76000 FLUOROSCOPY <1 HR PHYS/QHP: CPT | Performed by: STUDENT IN AN ORGANIZED HEALTH CARE EDUCATION/TRAINING PROGRAM

## 2025-01-13 PROCEDURE — 99233 SBSQ HOSP IP/OBS HIGH 50: CPT | Performed by: INTERNAL MEDICINE

## 2025-01-13 PROCEDURE — 0QSJ36Z REPOSITION RIGHT FIBULA WITH INTRAMEDULLARY INTERNAL FIXATION DEVICE, PERCUTANEOUS APPROACH: ICD-10-PCS | Performed by: STUDENT IN AN ORGANIZED HEALTH CARE EDUCATION/TRAINING PROGRAM

## 2025-01-13 PROCEDURE — 0QSG36Z REPOSITION RIGHT TIBIA WITH INTRAMEDULLARY INTERNAL FIXATION DEVICE, PERCUTANEOUS APPROACH: ICD-10-PCS | Performed by: STUDENT IN AN ORGANIZED HEALTH CARE EDUCATION/TRAINING PROGRAM

## 2025-01-13 DEVICE — ADVANCED LOCKING SCREW: Type: IMPLANTABLE DEVICE | Site: TIBIA | Status: FUNCTIONAL

## 2025-01-13 DEVICE — CANNULATED SCREW
Type: IMPLANTABLE DEVICE | Site: TIBIA | Status: FUNCTIONAL
Brand: ASNIS

## 2025-01-13 DEVICE — LOCKING SCREW
Type: IMPLANTABLE DEVICE | Site: TIBIA | Status: FUNCTIONAL
Brand: T2 ALPHA

## 2025-01-13 DEVICE — TIBIAL NAIL
Type: IMPLANTABLE DEVICE | Site: TIBIA | Status: FUNCTIONAL
Brand: T2 ALPHA

## 2025-01-13 DEVICE — UNTHREADED GUIDE WIRE
Type: IMPLANTABLE DEVICE | Site: TIBIA
Brand: FIXOS

## 2025-01-13 RX ORDER — OXYCODONE HYDROCHLORIDE 5 MG/1
5 TABLET ORAL EVERY 4 HOURS PRN
Status: DISCONTINUED | OUTPATIENT
Start: 2025-01-13 | End: 2025-01-14

## 2025-01-13 RX ORDER — TRAMADOL HYDROCHLORIDE 50 MG/1
50 TABLET ORAL EVERY 6 HOURS SCHEDULED
Status: DISCONTINUED | OUTPATIENT
Start: 2025-01-13 | End: 2025-01-14

## 2025-01-13 RX ORDER — HYDROMORPHONE HYDROCHLORIDE 1 MG/ML
0.2 INJECTION, SOLUTION INTRAMUSCULAR; INTRAVENOUS; SUBCUTANEOUS EVERY 2 HOUR PRN
Status: DISCONTINUED | OUTPATIENT
Start: 2025-01-13 | End: 2025-01-14

## 2025-01-13 RX ORDER — ASPIRIN 81 MG/1
81 TABLET ORAL 2 TIMES DAILY
Status: DISCONTINUED | OUTPATIENT
Start: 2025-01-13 | End: 2025-01-14

## 2025-01-13 RX ORDER — MORPHINE SULFATE 4 MG/ML
2 INJECTION, SOLUTION INTRAMUSCULAR; INTRAVENOUS EVERY 10 MIN PRN
Status: DISCONTINUED | OUTPATIENT
Start: 2025-01-13 | End: 2025-01-13 | Stop reason: HOSPADM

## 2025-01-13 RX ORDER — ONDANSETRON 2 MG/ML
4 INJECTION INTRAMUSCULAR; INTRAVENOUS EVERY 6 HOURS PRN
Status: DISCONTINUED | OUTPATIENT
Start: 2025-01-13 | End: 2025-01-13 | Stop reason: HOSPADM

## 2025-01-13 RX ORDER — DOCUSATE SODIUM 100 MG/1
100 CAPSULE, LIQUID FILLED ORAL 2 TIMES DAILY
Status: DISCONTINUED | OUTPATIENT
Start: 2025-01-13 | End: 2025-01-14

## 2025-01-13 RX ORDER — PROCHLORPERAZINE EDISYLATE 5 MG/ML
5 INJECTION INTRAMUSCULAR; INTRAVENOUS EVERY 8 HOURS PRN
Status: DISCONTINUED | OUTPATIENT
Start: 2025-01-13 | End: 2025-01-14

## 2025-01-13 RX ORDER — DOXEPIN HYDROCHLORIDE 50 MG/1
1 CAPSULE ORAL DAILY
Status: DISCONTINUED | OUTPATIENT
Start: 2025-01-14 | End: 2025-01-14

## 2025-01-13 RX ORDER — ONDANSETRON 2 MG/ML
INJECTION INTRAMUSCULAR; INTRAVENOUS AS NEEDED
Status: DISCONTINUED | OUTPATIENT
Start: 2025-01-13 | End: 2025-01-13 | Stop reason: SURG

## 2025-01-13 RX ORDER — HYDROMORPHONE HYDROCHLORIDE 1 MG/ML
0.6 INJECTION, SOLUTION INTRAMUSCULAR; INTRAVENOUS; SUBCUTANEOUS EVERY 5 MIN PRN
Status: DISCONTINUED | OUTPATIENT
Start: 2025-01-13 | End: 2025-01-13 | Stop reason: HOSPADM

## 2025-01-13 RX ORDER — LIDOCAINE HYDROCHLORIDE 10 MG/ML
INJECTION, SOLUTION EPIDURAL; INFILTRATION; INTRACAUDAL; PERINEURAL AS NEEDED
Status: DISCONTINUED | OUTPATIENT
Start: 2025-01-13 | End: 2025-01-13 | Stop reason: SURG

## 2025-01-13 RX ORDER — EPHEDRINE SULFATE 50 MG/ML
INJECTION, SOLUTION INTRAVENOUS AS NEEDED
Status: DISCONTINUED | OUTPATIENT
Start: 2025-01-13 | End: 2025-01-13 | Stop reason: SURG

## 2025-01-13 RX ORDER — SODIUM CHLORIDE, SODIUM LACTATE, POTASSIUM CHLORIDE, CALCIUM CHLORIDE 600; 310; 30; 20 MG/100ML; MG/100ML; MG/100ML; MG/100ML
INJECTION, SOLUTION INTRAVENOUS CONTINUOUS
Status: DISCONTINUED | OUTPATIENT
Start: 2025-01-13 | End: 2025-01-13 | Stop reason: HOSPADM

## 2025-01-13 RX ORDER — HYDROMORPHONE HYDROCHLORIDE 1 MG/ML
0.2 INJECTION, SOLUTION INTRAMUSCULAR; INTRAVENOUS; SUBCUTANEOUS EVERY 5 MIN PRN
Status: DISCONTINUED | OUTPATIENT
Start: 2025-01-13 | End: 2025-01-13 | Stop reason: HOSPADM

## 2025-01-13 RX ORDER — SODIUM PHOSPHATE, DIBASIC AND SODIUM PHOSPHATE, MONOBASIC 7; 19 G/230ML; G/230ML
1 ENEMA RECTAL ONCE AS NEEDED
Status: DISCONTINUED | OUTPATIENT
Start: 2025-01-13 | End: 2025-01-14

## 2025-01-13 RX ORDER — PROCHLORPERAZINE EDISYLATE 5 MG/ML
5 INJECTION INTRAMUSCULAR; INTRAVENOUS EVERY 8 HOURS PRN
Status: DISCONTINUED | OUTPATIENT
Start: 2025-01-13 | End: 2025-01-13 | Stop reason: HOSPADM

## 2025-01-13 RX ORDER — BISACODYL 10 MG
10 SUPPOSITORY, RECTAL RECTAL
Status: DISCONTINUED | OUTPATIENT
Start: 2025-01-13 | End: 2025-01-14

## 2025-01-13 RX ORDER — ACETAMINOPHEN 500 MG
1000 TABLET ORAL EVERY 8 HOURS SCHEDULED
Status: DISCONTINUED | OUTPATIENT
Start: 2025-01-13 | End: 2025-01-14

## 2025-01-13 RX ORDER — HYDROMORPHONE HYDROCHLORIDE 1 MG/ML
0.4 INJECTION, SOLUTION INTRAMUSCULAR; INTRAVENOUS; SUBCUTANEOUS EVERY 2 HOUR PRN
Status: DISCONTINUED | OUTPATIENT
Start: 2025-01-13 | End: 2025-01-14

## 2025-01-13 RX ORDER — MORPHINE SULFATE 10 MG/ML
6 INJECTION, SOLUTION INTRAMUSCULAR; INTRAVENOUS EVERY 10 MIN PRN
Status: DISCONTINUED | OUTPATIENT
Start: 2025-01-13 | End: 2025-01-13 | Stop reason: HOSPADM

## 2025-01-13 RX ORDER — KETOROLAC TROMETHAMINE 30 MG/ML
30 INJECTION, SOLUTION INTRAMUSCULAR; INTRAVENOUS EVERY 6 HOURS
Status: DISCONTINUED | OUTPATIENT
Start: 2025-01-13 | End: 2025-01-14

## 2025-01-13 RX ORDER — SENNOSIDES 8.6 MG
17.2 TABLET ORAL NIGHTLY
Status: DISCONTINUED | OUTPATIENT
Start: 2025-01-13 | End: 2025-01-14

## 2025-01-13 RX ORDER — PHENYLEPHRINE HCL 10 MG/ML
VIAL (ML) INJECTION AS NEEDED
Status: DISCONTINUED | OUTPATIENT
Start: 2025-01-13 | End: 2025-01-13 | Stop reason: SURG

## 2025-01-13 RX ORDER — MORPHINE SULFATE 4 MG/ML
4 INJECTION, SOLUTION INTRAMUSCULAR; INTRAVENOUS EVERY 10 MIN PRN
Status: DISCONTINUED | OUTPATIENT
Start: 2025-01-13 | End: 2025-01-13 | Stop reason: HOSPADM

## 2025-01-13 RX ORDER — OXYCODONE HYDROCHLORIDE 5 MG/1
2.5 TABLET ORAL EVERY 4 HOURS PRN
Status: DISCONTINUED | OUTPATIENT
Start: 2025-01-13 | End: 2025-01-14

## 2025-01-13 RX ORDER — NALOXONE HYDROCHLORIDE 0.4 MG/ML
80 INJECTION, SOLUTION INTRAMUSCULAR; INTRAVENOUS; SUBCUTANEOUS AS NEEDED
Status: DISCONTINUED | OUTPATIENT
Start: 2025-01-13 | End: 2025-01-13 | Stop reason: HOSPADM

## 2025-01-13 RX ORDER — ACETAMINOPHEN 500 MG
1000 TABLET ORAL ONCE AS NEEDED
Status: DISCONTINUED | OUTPATIENT
Start: 2025-01-13 | End: 2025-01-13 | Stop reason: HOSPADM

## 2025-01-13 RX ORDER — ONDANSETRON 2 MG/ML
4 INJECTION INTRAMUSCULAR; INTRAVENOUS EVERY 6 HOURS PRN
Status: DISCONTINUED | OUTPATIENT
Start: 2025-01-13 | End: 2025-01-14

## 2025-01-13 RX ORDER — DEXAMETHASONE SODIUM PHOSPHATE 4 MG/ML
VIAL (ML) INJECTION AS NEEDED
Status: DISCONTINUED | OUTPATIENT
Start: 2025-01-13 | End: 2025-01-13 | Stop reason: SURG

## 2025-01-13 RX ORDER — HYDROMORPHONE HYDROCHLORIDE 1 MG/ML
0.4 INJECTION, SOLUTION INTRAMUSCULAR; INTRAVENOUS; SUBCUTANEOUS EVERY 5 MIN PRN
Status: DISCONTINUED | OUTPATIENT
Start: 2025-01-13 | End: 2025-01-13 | Stop reason: HOSPADM

## 2025-01-13 RX ORDER — POLYETHYLENE GLYCOL 3350 17 G/17G
17 POWDER, FOR SOLUTION ORAL DAILY PRN
Status: DISCONTINUED | OUTPATIENT
Start: 2025-01-13 | End: 2025-01-14

## 2025-01-13 RX ADMIN — ONDANSETRON 4 MG: 2 INJECTION INTRAMUSCULAR; INTRAVENOUS at 17:32:00

## 2025-01-13 RX ADMIN — LIDOCAINE HYDROCHLORIDE 50 MG: 10 INJECTION, SOLUTION EPIDURAL; INFILTRATION; INTRACAUDAL; PERINEURAL at 17:32:00

## 2025-01-13 RX ADMIN — SODIUM CHLORIDE: 9 INJECTION, SOLUTION INTRAVENOUS at 17:32:00

## 2025-01-13 RX ADMIN — EPHEDRINE SULFATE 10 MG: 50 INJECTION, SOLUTION INTRAVENOUS at 17:41:00

## 2025-01-13 RX ADMIN — PHENYLEPHRINE HCL 100 MCG: 10 MG/ML VIAL (ML) INJECTION at 17:56:00

## 2025-01-13 RX ADMIN — DEXAMETHASONE SODIUM PHOSPHATE 4 MG: 4 MG/ML VIAL (ML) INJECTION at 17:32:00

## 2025-01-13 NOTE — PHYSICAL THERAPY NOTE
Chart reviewed for PT follow-up. Patient scheduled for right tibia IM nailing with Dr. Behery this evening. Patient will benefit from new PT orders and updated weight bearing status post-op.     Jenna Haase, PT, DPT  [facility]  Ext: [phone]

## 2025-01-13 NOTE — OCCUPATIONAL THERAPY NOTE
OT order received, chart reviewed. Per chart, patient to have surgery today. Will place patient ON HOLD and complete current order. Will appreciate updated WB status s/p surgery.    Katelyn Thompson OTR/L  [facility]  #[phone]

## 2025-01-13 NOTE — ANESTHESIA PROCEDURE NOTES
Airway  Date/Time: 1/13/2025 5:34 PM  Urgency: Elective    Airway not difficult    General Information and Staff    Patient location during procedure: OR  Anesthesiologist: Silvestre Robles MD  Performed: anesthesiologist   Performed by: Silvestre Robles MD  Authorized by: Silvestre Robles MD      Indications and Patient Condition  Indications for airway management: anesthesia  Sedation level: deep  Preoxygenated: yes  Patient position: sniffing  Mask difficulty assessment: 1 - vent by mask    Final Airway Details  Final airway type: endotracheal airway      Successful airway: ETT  Cuffed: yes   Successful intubation technique: Video laryngoscopy  Endotracheal tube insertion site: oral  Blade: Carrol  Blade size: #3  ETT size (mm): 7.5    Cormack-Lehane Classification: grade I - full view of glottis  Placement verified by: capnometry   Measured from: teeth  ETT to teeth (cm): 22  Number of attempts at approach: 1    Additional Comments  Atx x 1

## 2025-01-13 NOTE — PLAN OF CARE
Patient is A/Ox4. On RA. NPO for procedure later today. IVF infusing per order. Voiding via urinal. Splint in place to RLE. PRN norco and morphine given for pain. Call light and personal items within reach. Plan for discharge when medically cleared.       Problem: PAIN - ADULT  Goal: Verbalizes/displays adequate comfort level or patient's stated pain goal  Description: INTERVENTIONS:  - Encourage pt to monitor pain and request assistance  - Assess pain using appropriate pain scale  - Administer analgesics based on type and severity of pain and evaluate response  - Implement non-pharmacological measures as appropriate and evaluate response  - Consider cultural and social influences on pain and pain management  - Manage/alleviate anxiety  - Utilize distraction and/or relaxation techniques  - Monitor for opioid side effects  - Notify MD/LIP if interventions unsuccessful or patient reports new pain  - Anticipate increased pain with activity and pre-medicate as appropriate  Outcome: Progressing     Problem: RISK FOR INFECTION - ADULT  Goal: Absence of fever/infection during anticipated neutropenic period  Description: INTERVENTIONS  - Monitor WBC  - Administer growth factors as ordered  - Implement neutropenic guidelines  Outcome: Progressing     Problem: SAFETY ADULT - FALL  Goal: Free from fall injury  Description: INTERVENTIONS:  - Assess pt frequently for physical needs  - Identify cognitive and physical deficits and behaviors that affect risk of falls.  - [place] fall precautions as indicated by assessment.  - Educate pt/family on patient safety including physical limitations  - Instruct pt to call for assistance with activity based on assessment  - Modify environment to reduce risk of injury  - Provide assistive devices as appropriate  - Consider OT/PT consult to assist with strengthening/mobility  - Encourage toileting schedule  Outcome: Progressing     Problem: DISCHARGE PLANNING  Goal: Discharge to home or  other facility with appropriate resources  Description: INTERVENTIONS:  - Identify barriers to discharge w/pt and caregiver  - Include patient/family/discharge partner in discharge planning  - Arrange for needed discharge resources and transportation as appropriate  - Identify discharge learning needs (meds, wound care, etc)  - Arrange for interpreters to assist at discharge as needed  - Consider post-discharge preferences of patient/family/discharge partner  - Complete POLST form as appropriate  - Assess patient's ability to be responsible for managing their own health  - Refer to Case Management Department for coordinating discharge planning if the patient needs post-hospital services based on physician/LIP order or complex needs related to functional status, cognitive ability or social support system  Outcome: Progressing

## 2025-01-13 NOTE — ANESTHESIA PREPROCEDURE EVALUATION
Anesthesia PreOp Note    HPI:     Shaw Altman is a 59 year old male who presents for preoperative consultation requested by: Behery, Omar Atef, MD    Date of Surgery: 1/11/2025 - 1/13/2025    Procedure(s):  RIGHT TIBIA INTRAMEDULLARY NAILING  Indication: right leg fracture    Relevant Problems   No relevant active problems       NPO:  Last Liquid Consumption Date: 01/13/25  Last Liquid Consumption Time: 0500  Last Solid Consumption Date: 01/13/25  Last Solid Consumption Time: 0500  Last Liquid Consumption Date: 01/13/25          History Review:  Patient Active Problem List    Diagnosis Date Noted    Leg fracture, right, closed, initial encounter 01/11/2025       Past Medical History:    Back problem       History reviewed. No pertinent surgical history.    Prescriptions Prior to Admission[1]  Current Medications and Prescriptions Ordered in Epic[2]    Allergies[3]    History reviewed. No pertinent family history.  Social History     Socioeconomic History    Marital status:    Tobacco Use    Smoking status: Never    Smokeless tobacco: Never   Substance and Sexual Activity    Alcohol use: Yes    Drug use: No       Available pre-op labs reviewed.  Lab Results   Component Value Date    WBC 6.4 01/13/2025    RBC 4.63 01/13/2025    HGB 13.7 01/13/2025    HCT 40.3 01/13/2025    MCV 87.0 01/13/2025    MCH 29.6 01/13/2025    MCHC 34.0 01/13/2025    RDW 12.9 01/13/2025    .0 01/13/2025     Lab Results   Component Value Date     01/13/2025    K 4.0 01/13/2025     01/13/2025    CO2 28.0 01/13/2025    BUN 12 01/13/2025    CREATSERUM 0.99 01/13/2025     (H) 01/13/2025    CA 9.0 01/13/2025     Lab Results   Component Value Date    INR 1.00 01/11/2025       Vital Signs:  Body mass index is 29.22 kg/m².   height is 1.93 m (6' 4\") and weight is 108.9 kg (240 lb 1.3 oz). His oral temperature is 98.3 °F (36.8 °C). His blood pressure is 145/68 and his pulse is 64. His respiration is 18 and oxygen  saturation is 96%.   Vitals:    01/12/25 2013 01/13/25 0611 01/13/25 0814 01/13/25 1633   BP: (!) 149/96 128/83 131/81 145/68   Pulse: 66 62 53 64   Resp: 16 16 18 18   Temp: 99.4 °F (37.4 °C) 98.8 °F (37.1 °C) 98.6 °F (37 °C) 98.3 °F (36.8 °C)   TempSrc: Oral Oral Oral Oral   SpO2: 95% 94% 94% 96%   Weight:       Height:            Anesthesia Evaluation     Patient summary reviewed and Nursing notes reviewed    Airway   Mallampati: II  TM distance: >3 FB  Neck ROM: full  Dental - Dentition appears grossly intact     Pulmonary - negative ROS and normal exam   Cardiovascular - negative ROS and normal exam    Neuro/Psych - negative ROS     GI/Hepatic/Renal - negative ROS     Endo/Other - negative ROS   Abdominal  - normal exam                 Anesthesia Plan:   ASA:  1  Emergent    Plan:   General  Airway:  ETT  Post-op Pain Management: IV analgesics  Informed Consent Plan and Risks Discussed With:  Patient  Use of Blood Products Discussed With:  Patient      I have informed Shaw Altman and/or legal guardian or family member of the nature of the anesthetic plan, benefits, risks including possible dental damage if relevant, major complications, and any alternative forms of anesthetic management.   All of the patient's questions were answered to the best of my ability. The patient desires the anesthetic management as planned.  BOBBI GALICIA MD  1/13/2025 4:39 PM  Present on Admission:  **None**           [1]   No medications prior to admission.   [2]   Current Facility-Administered Medications Ordered in Epic   Medication Dose Route Frequency Provider Last Rate Last Admin    mupirocin (Bactroban) 2% nasal ointment 1 Application  1 Application Each Nare BID Behery, Omar Atef, MD        [Transfer Hold] melatonin tab 3 mg  3 mg Oral Nightly PRN Balta Gonzalez MD   3 mg at 01/12/25 2023    sodium chloride 0.9% infusion   Intravenous Continuous Balta Gonzalez  mL/hr at 01/13/25 1230 New Bag at 01/13/25 1230     [Transfer Hold] heparin (Porcine) 5000 UNIT/ML injection 5,000 Units  5,000 Units Subcutaneous Q8H [facility] Balta [facility] MD   5,000 Units at 01/12/25 2022    [Transfer Hold] acetaminophen (Tylenol Extra Strength) tab 500 mg  500 mg Oral Q4H PRBalta Weinberg MD        [Transfer Hold] acetaminophen (Tylenol) tab 650 mg  650 mg Oral Q4H PRN Balta Gonzalez MD        Or    [Transfer Hold] HYDROcodone-acetaminophen (Norco) 5-325 MG per tab 1 tablet  1 tablet Oral Q4H PRN Balta Gonzalez MD   1 tablet at 01/12/25 1640    Or    [Transfer Hold] HYDROcodone-acetaminophen (Norco) 5-325 MG per tab 2 tablet  2 tablet Oral Q4H PRBalta Weinberg MD   2 tablet at 01/13/25 0955    [Transfer Hold] morphINE PF 2 MG/ML injection 1 mg  1 mg Intravenous Q2H PRN Balta Gonzalez MD        Or    [Transfer Hold] morphINE PF 2 MG/ML injection 2 mg  2 mg Intravenous Q2H PRBalta Weinberg MD   2 mg at 01/13/25 1603    Or    [Transfer Hold] morphINE PF 4 MG/ML injection 4 mg  4 mg Intravenous Q2H PRBalta Weinberg MD   4 mg at 01/11/25 1942     Current Outpatient Medications Ordered in Epic   Medication Sig Dispense Refill    HYDROcodone-acetaminophen 5-325 MG Oral Tab Take 1 tablet by mouth every 6 (six) hours as needed. 20 tablet 0   [3]   Allergies  Allergen Reactions    Erythromycin     Tetanus Toxoids     Tetracycline

## 2025-01-13 NOTE — PROGRESS NOTES
[facility]  [facility]     Shaw Altman Patient Status:  Observation    1965 MRN [id number]   Location [facility] 4W/SW/SE Attending Balta Gonzalez MD   Hosp Day # 0 PCP Silvestre Lopez MD     Subjective:     Patient resting comfortably and in NAD. Denied any active complaints.   Surgery later today.   In good spirits.       Objective:    Review of Systems:   ROS completed; pertinent positive and negatives stated in subjective.      Vital signs:  Temp:  [98.6 °F (37 °C)-99.4 °F (37.4 °C)] 98.6 °F (37 °C)  Pulse:  [53-70] 53  Resp:  [16-18] 18  BP: (123-149)/(79-96) 131/81  SpO2:  [92 %-95 %] 94 %      Physical Exam:    Gen: NAD AO x3  Chest: good air entry CTABL  CVS: normal s1 and s2 RR  Abd: NABS soft NT ND  Neuro: CN 2-12 grossly intact  Ext: RLE splint       Diagnostic Data:    Labs:  Recent Labs   Lab 25  0501 25  0533   WBC  --  7.6 6.4   HGB  --  14.3 13.7   MCV  --  86.8 87.0   PLT  --  193.0 176.0   INR 1.00  --   --        Recent Labs   Lab 25  0501 25  0533   * 107*   BUN 15 12   CREATSERUM 1.01 0.99   CA 9.3 9.0   ALB  --  4.0    140   K 4.3 4.0    104   CO2 28.0 28.0   ALKPHO  --  95   AST  --  18   ALT  --  16   BILT  --  0.7   TP  --  6.5       Estimated Creatinine Clearance: 97.4 mL/min (based on SCr of 0.99 mg/dL).    Recent Labs   Lab 25   PTP 13.8   INR 1.00              Imaging: Imaging data reviewed in Epic.    Medications:    heparin  5,000 Units Subcutaneous Q8H [place]       Assessment & Plan:     R tibial fracture  Imaging reviewed  Ortho on consult  Operative management  NPO after breakfast 25  NWB RLE  Heparin s/c  PRN pain control  DVT ppx, IVF, abx and pain meds per ortho  PT/OT once cleared by ortho  Supportive care      Plan of care discussed with patient or family at bedside.      Supplementary Documentation:     Quality:  DVT Prophylaxis:  Heparin s/c  CODE status: Full       Estimated date of discharge: TBD  Discharge is dependent on: clinical stability  At this point Mr. Altman is expected to be discharge to: home                   Balta Gonzalez MD  Hospitalist    MDM: High, I personally reviewed the available laboratories, imaging including XR. I discussed the case with RN. I ordered laboratories, studies including AM labs.  Medical decision making high, risk is high.       The 21st Century Cures Act makes medical notes like these available to patients in the interest of transparency. Please be advised this is a medical document. Medical documents are intended to carry relevant information, facts as evident, and the clinical opinion of the practitioner. The medical note is intended as peer to peer communication and may appear blunt or direct. It is written in medical language and may contain abbreviations or verbiage that are unfamiliar.

## 2025-01-13 NOTE — PLAN OF CARE
Alert and oriented x4. Admitted for right fibula and tibial fracture. Urinal and voiding. Bedrest. Splint in place. Pain control. Ortho on consult. Plan for NPO at 5am, surgery later in the day.  Problem: Patient Centered Care  Goal: Patient preferences are identified and integrated in the patient's plan of care  Description: Interventions:  - What would you like us to know as we care for you? Home with spouse  - Provide timely, complete, and accurate information to patient/family  - Incorporate patient and family knowledge, values, beliefs, and cultural backgrounds into the planning and delivery of care  - Encourage patient/family to participate in care and decision-making at the level they choose  - Honor patient and family perspectives and choices  Outcome: Progressing     Problem: Patient/Family Goals  Goal: Patient/Family Long Term Goal  Description: Patient's Long Term Goal: discharge     Interventions:  - follow md orders  -monitor labs  -discharge planning  -update pt and family on plan of care  - See additional Care Plan goals for specific interventions  Outcome: Progressing  Goal: Patient/Family Short Term Goal  Description: Patient's Short Term Goal: pain management    Interventions:   - follow md orders  -take pain medication as needed  -non-pharmacologic therapy  -PT/OT  - See additional Care Plan goals for specific interventions  Outcome: Progressing     Problem: PAIN - ADULT  Goal: Verbalizes/displays adequate comfort level or patient's stated pain goal  Description: INTERVENTIONS:  - Encourage pt to monitor pain and request assistance  - Assess pain using appropriate pain scale  - Administer analgesics based on type and severity of pain and evaluate response  - Implement non-pharmacological measures as appropriate and evaluate response  - Consider cultural and social influences on pain and pain management  - Manage/alleviate anxiety  - Utilize distraction and/or relaxation techniques  - Monitor for  opioid side effects  - Notify MD/LIP if interventions unsuccessful or patient reports new pain  - Anticipate increased pain with activity and pre-medicate as appropriate  Outcome: Progressing     Problem: RISK FOR INFECTION - ADULT  Goal: Absence of fever/infection during anticipated neutropenic period  Description: INTERVENTIONS  - Monitor WBC  - Administer growth factors as ordered  - Implement neutropenic guidelines  Outcome: Progressing     Problem: SAFETY ADULT - FALL  Goal: Free from fall injury  Description: INTERVENTIONS:  - Assess pt frequently for physical needs  - Identify cognitive and physical deficits and behaviors that affect risk of falls.  - [place] fall precautions as indicated by assessment.  - Educate pt/family on patient safety including physical limitations  - Instruct pt to call for assistance with activity based on assessment  - Modify environment to reduce risk of injury  - Provide assistive devices as appropriate  - Consider OT/PT consult to assist with strengthening/mobility  - Encourage toileting schedule  Outcome: Progressing     Problem: DISCHARGE PLANNING  Goal: Discharge to home or other facility with appropriate resources  Description: INTERVENTIONS:  - Identify barriers to discharge w/pt and caregiver  - Include patient/family/discharge partner in discharge planning  - Arrange for needed discharge resources and transportation as appropriate  - Identify discharge learning needs (meds, wound care, etc)  - Arrange for interpreters to assist at discharge as needed  - Consider post-discharge preferences of patient/family/discharge partner  - Complete POLST form as appropriate  - Assess patient's ability to be responsible for managing their own health  - Refer to Case Management Department for coordinating discharge planning if the patient needs post-hospital services based on physician/LIP order or complex needs related to functional status, cognitive ability or social support  system  Outcome: Progressing

## 2025-01-14 VITALS
DIASTOLIC BLOOD PRESSURE: 84 MMHG | OXYGEN SATURATION: 94 % | BODY MASS INDEX: 29.23 KG/M2 | SYSTOLIC BLOOD PRESSURE: 157 MMHG | WEIGHT: 240.06 LBS | RESPIRATION RATE: 18 BRPM | TEMPERATURE: 98 F | HEIGHT: 76 IN | HEART RATE: 103 BPM

## 2025-01-14 LAB
ALBUMIN SERPL-MCNC: 4.1 G/DL (ref 3.2–4.8)
ALBUMIN/GLOB SERPL: 1.5 {RATIO} (ref 1–2)
ALP LIVER SERPL-CCNC: 84 U/L
ALT SERPL-CCNC: 16 U/L
ANION GAP SERPL CALC-SCNC: 6 MMOL/L (ref 0–18)
AST SERPL-CCNC: 26 U/L (ref ?–34)
BASOPHILS # BLD AUTO: 0.01 X10(3) UL (ref 0–0.2)
BASOPHILS NFR BLD AUTO: 0.1 %
BILIRUB SERPL-MCNC: 0.6 MG/DL (ref 0.2–1.1)
BUN BLD-MCNC: 13 MG/DL (ref 9–23)
BUN/CREAT SERPL: 13.7 (ref 10–20)
CALCIUM BLD-MCNC: 9.2 MG/DL (ref 8.7–10.4)
CHLORIDE SERPL-SCNC: 102 MMOL/L (ref 98–112)
CO2 SERPL-SCNC: 28 MMOL/L (ref 21–32)
CREAT BLD-MCNC: 0.95 MG/DL
DEPRECATED RDW RBC AUTO: 40.9 FL (ref 35.1–46.3)
EGFRCR SERPLBLD CKD-EPI 2021: 92 ML/MIN/1.73M2 (ref 60–?)
EOSINOPHIL # BLD AUTO: 0 X10(3) UL (ref 0–0.7)
EOSINOPHIL NFR BLD AUTO: 0 %
ERYTHROCYTE [DISTWIDTH] IN BLOOD BY AUTOMATED COUNT: 12.9 % (ref 11–15)
GLOBULIN PLAS-MCNC: 2.7 G/DL (ref 2–3.5)
GLUCOSE BLD-MCNC: 142 MG/DL (ref 70–99)
HCT VFR BLD AUTO: 40.7 %
HCT VFR BLD AUTO: 41.7 %
HGB BLD-MCNC: 13.4 G/DL
HGB BLD-MCNC: 14.2 G/DL
IMM GRANULOCYTES # BLD AUTO: 0.02 X10(3) UL (ref 0–1)
IMM GRANULOCYTES NFR BLD: 0.2 %
LYMPHOCYTES # BLD AUTO: 0.71 X10(3) UL (ref 1–4)
LYMPHOCYTES NFR BLD AUTO: 7.5 %
MCH RBC QN AUTO: 28.5 PG (ref 26–34)
MCHC RBC AUTO-ENTMCNC: 32.9 G/DL (ref 31–37)
MCV RBC AUTO: 86.6 FL
MONOCYTES # BLD AUTO: 0.72 X10(3) UL (ref 0.1–1)
MONOCYTES NFR BLD AUTO: 7.6 %
MRSA DNA SPEC QL NAA+PROBE: NEGATIVE
NEUTROPHILS # BLD AUTO: 8.03 X10 (3) UL (ref 1.5–7.7)
NEUTROPHILS # BLD AUTO: 8.03 X10(3) UL (ref 1.5–7.7)
NEUTROPHILS NFR BLD AUTO: 84.6 %
OSMOLALITY SERPL CALC.SUM OF ELEC: 285 MOSM/KG (ref 275–295)
PLATELET # BLD AUTO: 192 10(3)UL (ref 150–450)
POTASSIUM SERPL-SCNC: 4.5 MMOL/L (ref 3.5–5.1)
PROT SERPL-MCNC: 6.8 G/DL (ref 5.7–8.2)
RBC # BLD AUTO: 4.7 X10(6)UL
SODIUM SERPL-SCNC: 136 MMOL/L (ref 136–145)
WBC # BLD AUTO: 9.5 X10(3) UL (ref 4–11)

## 2025-01-14 PROCEDURE — 99239 HOSP IP/OBS DSCHRG MGMT >30: CPT | Performed by: INTERNAL MEDICINE

## 2025-01-14 NOTE — PLAN OF CARE
Alert and oriented x4. Patient cleared for discharge per hospitalist and ortho doctor. Medications, discharge instructions, and follow up appointments reviewed with patient. All needs met. Rx at pharmacy. Belongings taken with patient. Spouse to transfer home.       Problem: Patient Centered Care  Goal: Patient preferences are identified and integrated in the patient's plan of care  Description: Interventions:  - What would you like us to know as we care for you?   - Provide timely, complete, and accurate information to patient/family  - Incorporate patient and family knowledge, values, beliefs, and cultural backgrounds into the planning and delivery of care  - Encourage patient/family to participate in care and decision-making at the level they choose  - Honor patient and family perspectives and choices  1/14/2025 1234 by Reyes, Taina, RN  Outcome: Adequate for Discharge  1/14/2025 1231 by Reyes, Taina, RN  Outcome: Progressing     Problem: Patient/Family Goals  Goal: Patient/Family Long Term Goal  Description: Patient's Long Term Goal: discharge     Interventions:  - follow md orders  -monitor labs  -discharge planning  -update pt and family on plan of care  - See additional Care Plan goals for specific interventions  1/14/2025 1234 by Reyes, Taina, RN  Outcome: Adequate for Discharge  1/14/2025 1231 by Reyes, Taina, RN  Outcome: Progressing  Goal: Patient/Family Short Term Goal  Description: Patient's Short Term Goal: pain management    Interventions:   - follow md orders  -take pain medication as needed  -non-pharmacologic therapy  -PT/OT  - See additional Care Plan goals for specific interventions  1/14/2025 1234 by Reyes, Taina, RN  Outcome: Adequate for Discharge  1/14/2025 1231 by Reyes, Taina, RN  Outcome: Progressing     Problem: PAIN - ADULT  Goal: Verbalizes/displays adequate comfort level or patient's stated pain goal  Description: INTERVENTIONS:  - Encourage pt to monitor pain and request assistance  -  Assess pain using appropriate pain scale  - Administer analgesics based on type and severity of pain and evaluate response  - Implement non-pharmacological measures as appropriate and evaluate response  - Consider cultural and social influences on pain and pain management  - Manage/alleviate anxiety  - Utilize distraction and/or relaxation techniques  - Monitor for opioid side effects  - Notify MD/LIP if interventions unsuccessful or patient reports new pain  - Anticipate increased pain with activity and pre-medicate as appropriate  1/14/2025 1234 by Reyes, Taina, RN  Outcome: Adequate for Discharge  1/14/2025 1231 by Reyes, Taina, RN  Outcome: Progressing     Problem: RISK FOR INFECTION - ADULT  Goal: Absence of fever/infection during anticipated neutropenic period  Description: INTERVENTIONS  - Monitor WBC  - Administer growth factors as ordered  - Implement neutropenic guidelines  1/14/2025 1234 by Reyes, Taina, RN  Outcome: Adequate for Discharge  1/14/2025 1231 by Reyes, Taina, RN  Outcome: Progressing     Problem: SAFETY ADULT - FALL  Goal: Free from fall injury  Description: INTERVENTIONS:  - Assess pt frequently for physical needs  - Identify cognitive and physical deficits and behaviors that affect risk of falls.  - [place] fall precautions as indicated by assessment.  - Educate pt/family on patient safety including physical limitations  - Instruct pt to call for assistance with activity based on assessment  - Modify environment to reduce risk of injury  - Provide assistive devices as appropriate  - Consider OT/PT consult to assist with strengthening/mobility  - Encourage toileting schedule  1/14/2025 1234 by Reyes, Taina, RN  Outcome: Adequate for Discharge  1/14/2025 1231 by Reyes, Taina, RN  Outcome: Progressing     Problem: DISCHARGE PLANNING  Goal: Discharge to home or other facility with appropriate resources  Description: INTERVENTIONS:  - Identify barriers to discharge w/pt and caregiver  -  Include patient/family/discharge partner in discharge planning  - Arrange for needed discharge resources and transportation as appropriate  - Identify discharge learning needs (meds, wound care, etc)  - Arrange for interpreters to assist at discharge as needed  - Consider post-discharge preferences of patient/family/discharge partner  - Complete POLST form as appropriate  - Assess patient's ability to be responsible for managing their own health  - Refer to Case Management Department for coordinating discharge planning if the patient needs post-hospital services based on physician/LIP order or complex needs related to functional status, cognitive ability or social support system  1/14/2025 1234 by Reyes, Taina, RN  Outcome: Adequate for Discharge  1/14/2025 1231 by Reyes, Taina, RN  Outcome: Progressing

## 2025-01-14 NOTE — DISCHARGE SUMMARY
[facility]  part of [facility]    DISCHARGE SUMMARY     Shaw Altman Patient Status:  Observation    1965 MRN [id number]   Location [facility] 4W/SW/SE Attending Balta Gonzalez MD   Hosp Day # 0 PCP Silvestre Lopez MD     Date of Admission: 2025  Date of Discharge:  2025    Discharge Disposition: Final discharge disposition not confirmed    Discharge Diagnosis:     R tibial fracture    History of Present Illness:     Sahw Altman is a 59 year old male presented ot the ER after he slipped on ice. He denied any loss of consciousness or injury to any other bony prominence.   He is currently waiting to speak with ortho.   He denied any fever, chills, sob, n/v/d or any other complaints.     Brief Synopsis:     Patient tolerated surgery well. Cleared for discharge by therapy.   Patient will follow up with PCP and Ortho as opt.    Patient is to remain compliant with all discharge medications and instructions and to follow up as advised.   Patient encouraged to make healthy lifestyle and dietary changes.    Lace+ Score: 32  59-90 High Risk  29-58 Medium Risk  0-28   Low Risk       TCM Follow-Up Recommendation:  LACE 29-58: Moderate Risk of readmission after discharge from the hospital.      Procedures during hospitalization:   IM nailing    Incidental or significant findings and recommendations (brief descriptions):  None    Lab/Test results pending at Discharge:   None    Consultants:  Ortho    Discharge Medication List:     Discharge Medications        START taking these medications        Instructions Prescription details   HYDROcodone-acetaminophen 5-325 MG Tabs  Commonly known as: Norco      Take 1 tablet by mouth every 6 (six) hours as needed.   Stop taking on: 2025  Quantity: 20 tablet  Refills: 0               Where to Get Your Medications        These medications were sent to [facility] [id number] - [address]  [phone], [phone]  [address] [address]      Phone: [phone]   HYDROcodone-acetaminophen 5-325 MG Tabs         [place]    Follow-up appointment:   Orlando Sevilla MD  [address] [facility] [address]  [facility] [id number] [phone]    Follow up      Silvestre Lopez MD  [address] [facility] [id number] [phone]    Follow up in 1 week(s)      Appointments for Next 30 Days 1/14/2025 - 2/13/2025      None            Vital signs:  Temp:  [96 °F (35.6 °C)-98.3 °F (36.8 °C)] 97.8 °F (36.6 °C)  Pulse:  [64-79] 76  Resp:  [10-18] 18  BP: (143-182)/() 157/84  SpO2:  [91 %-97 %] 94 %    Physical Exam:    Gen: NAD AO x3  Chest: good air entry CTABL  CVS: normal s1 and s2 RR  Abd: NABS soft NT ND  Neuro: CN 2-12 grossly intact  Ext: no edema in bilateral LE    -----------------------------------------------------------------------------------------------  PATIENT DISCHARGE INSTRUCTIONS: See electronic chart    Balta Gonzalez MD  Hospitalist    Time spent:  > 30 minutes    The 21st Century Cures Act makes medical notes like these available to patients in the interest of transparency. Please be advised this is a medical document. Medical documents are intended to carry relevant information, facts as evident, and the clinical opinion of the practitioner. The medical note is intended as peer to peer communication and may appear blunt or direct. It is written in medical language and may contain abbreviations or verbiage that are unfamiliar.

## 2025-01-14 NOTE — OCCUPATIONAL THERAPY NOTE
OCCUPATIONAL THERAPY EVALUATION - INPATIENT     Room Number: 410/410-A  Evaluation Date: 1/14/2025  Type of Evaluation: Initial       Physician Order: IP Consult to Occupational Therapy  Reason for Therapy: ADL/IADL Dysfunction and Discharge Planning    OCCUPATIONAL THERAPY ASSESSMENT   RN cleared pt for participation in OT session, which was completed in collaboration with PT. Upon arrival, pt was supine in bed and agreeable to activity. Wife present during session. Pt was left in chair. Call light and all needs left in reach. Handoff given to RN.    Patient is a 59 year old male admitted 1/11/2025 for R tibia IM nailing; NWB.  Prior to admission, pt was independent.  Patient is currently requiring up to min A for ADLs overall along with sup for supine to sit, sup for sitting at EOB, sup for STS, and sup for functional transfer at RW level. Pt tolerated about 1 minutes of supported standing. Pt safe to DC home with wife. Will DC.     Education provided  Educated pt about role of OT and hospital therapy process as well as proper safety techniques including proper hand placement, body mechanics, safety techniques, wearing L shoe, asking about Knee scooter. Pt verbalized/demonstrated good carryover.    DC OT      OCCUPATIONAL THERAPY MEDICAL/SOCIAL HISTORY     Problem List  Principal Problem:    Leg fracture, right, closed, initial encounter      Past Medical History  Past Medical History:    Back problem       Past Surgical History  History reviewed. No pertinent surgical history.    HOME SITUATION  Type of Home: House  Home Layout: Multi-level  Lives With: Spouse                              SUBJECTIVE  \"I will be fine at home.\"    OCCUPATIONAL THERAPY EXAMINATION      OBJECTIVE     Fall Risk: Standard fall risk    PAIN ASSESSMENT  Rating: Unable to rate  Location: RLE         RANGE OF MOTION   Upper extremity ROM is within functional limits     STRENGTH ASSESSMENT  Upper extremity strength is within functional  limits     COORDINATION  Gross Motor: WFL   Fine Motor: WFL     ACTIVITIES OF DAILY LIVING ASSESSMENT  AM-PAC ‘6-Clicks’ Inpatient Daily Activity Short Form  How much help from another person does the patient currently need…  -   Putting on and taking off regular lower body clothing?: A Little  -   Bathing (including washing, rinsing, drying)?: A Little  -   Toileting, which includes using toilet, bedpan or urinal? : None  -   Putting on and taking off regular upper body clothing?: None  -   Taking care of personal grooming such as brushing teeth?: None  -   Eating meals?: None    AM-PAC Score:  Score: 22  Approx Degree of Impairment: 25.8%  Standardized Score (AM-PAC Scale): 47.1  CMS Modifier (G-Code): CJ      FUNCTIONAL TRANSFER ASSESSMENT  supervision    FUNCTIONAL ADL ASSESSMENT  Min A    The patient's Approx Degree of Impairment: 25.8% has been calculated based on documentation in the [facility] '6 clicks' Inpatient Daily Activity Short Form.  Research supports that patients with this level of impairment may benefit from home. Final disposition will be made by interdisciplinary medical team.         Patient Evaluation Complexity Level:   Occupational Profile/Medical History MODERATE - Expanded review of history including review of medical or therapy record   Specific performance deficits impacting engagement in ADL/IADL MODERATE  3 - 5 performance deficits   Client Assessment/Performance Deficits MODERATE - Comorbidities and min to mod modifications of tasks    Clinical Decision Making MODERATE - Analysis of occupational profile, detailed assessments, several treatment options    Overall Complexity MODERATE     OT Session Time: 20 minutes  Self-Care Home Management: 10 minutes           Radha Perdomo OT  [facility] [place]  Inpatient Rehabilitation  Occupational Therapy  [phone]

## 2025-01-14 NOTE — CONSULTS
Spiritual Care Visit Note    Patient Name: Shaw Altman Date of Spiritual Care Visit: 25   : 1965 Primary Dx: Leg fracture, right, closed, initial encounter       Referred By: Referral From: Care Coordination    Spiritual Care Taxonomy:    Intended Effects: Demonstrate caring and concern    Methods: Collaborate with care team member;Offer support    Interventions: Active listening;Ask guided questions;Assist someone with Advance Directives    Visit Type/Summary:     - PoA: Other: Patient did not wish to receive advance directive information at this time. Patient report getting one completed yesterday by a friend. I requested a copy be given to medical records. Writer gave patient a Spiritual Care card.  remains available for follow up.     Hood Smith, MAT CAMII   [phone]     Spiritual Care support can be requested via an [facility] consult. For urgent/immediate needs, please contact the On Call  at: [place]: ext [phone]

## 2025-01-14 NOTE — PROGRESS NOTES
[facility]     Shaw Altman Patient Status:  Observation    1965 MRN [id number]   Location [facility] 4W/SW/SE Attending Balta Gonzalez MD   Hosp Day # 0 PCP Silvestre Lopez MD     Subjective:     Patient resting comfortably and in NAD. Denied any active complaints.   Tolerated surgery and is now waiting to work with PT and OT.   He reported his pain is well controlled.       Objective:    Review of Systems:   ROS completed; pertinent positive and negatives stated in subjective.      Vital signs:  Temp:  [96 °F (35.6 °C)-98.3 °F (36.8 °C)] 97.8 °F (36.6 °C)  Pulse:  [64-79] 76  Resp:  [10-18] 18  BP: (143-182)/() 157/84  SpO2:  [91 %-97 %] 94 %      Physical Exam:    Gen: NAD AO x3  Chest: good air entry CTABL  CVS: normal s1 and s2 RR  Abd: NABS soft NT ND  Neuro: CN 2-12 grossly intact  Ext: RLE splint       Diagnostic Data:    Labs:  Recent Labs   Lab 25  0501 25  0533 25  2357 25  0455   WBC  --  7.6 6.4  --  9.5   HGB  --  14.3 13.7 14.2 13.4   MCV  --  86.8 87.0  --  86.6   PLT  --  193.0 176.0  --  192.0   INR 1.00  --   --   --   --        Recent Labs   Lab 25  0501 25  0533 25  0455   * 107* 142*   BUN 15 12 13   CREATSERUM 1.01 0.99 0.95   CA 9.3 9.0 9.2   ALB  --  4.0 4.1    140 136   K 4.3 4.0 4.5    104 102   CO2 28.0 28.0 28.0   ALKPHO  --  95 84   AST  --  18 26   ALT  --  16 16   BILT  --  0.7 0.6   TP  --  6.5 6.8       Estimated Creatinine Clearance: 101.5 mL/min (based on SCr of 0.95 mg/dL).    Recent Labs   Lab 25   PTP 13.8   INR 1.00              Imaging: Imaging data reviewed in Epic.    Medications:    sennosides  17.2 mg Oral Nightly    docusate sodium  100 mg Oral BID    multivitamin  1 tablet Oral Daily    aspirin  81 mg Oral BID    acetaminophen  1,000 mg Oral Q8H FRANCE    ketorolac  30 mg Intravenous Q6H     traMADol  50 mg Oral 4 times per day    mupirocin  1 Application Each Nare BID       Assessment & Plan:     R tibial fracture  Imaging reviewed  Ortho on consult  Operative management  NPO after breakfast 1/13/25  NWB RLE  Heparin s/c  PRN pain control  DVT ppx, IVF, abx and pain meds per ortho  PT/OT once cleared by ortho  Supportive care  Management per surgery  Discharge planning      Plan of care discussed with patient or family at bedside.      Supplementary Documentation:     Quality:  DVT Prophylaxis: Heparin s/c  CODE status: Full       Estimated date of discharge: TBD  Discharge is dependent on: clinical stability  At this point Mr. Altman is expected to be discharge to: home                   Balta Gonzalez MD  Hospitalist    MDM: High, I personally reviewed the available laboratories, imaging including XR. I discussed the case with RN. I ordered laboratories, studies including AM labs.  Medical decision making high, risk is high.       The 21st Century Cures Act makes medical notes like these available to patients in the interest of transparency. Please be advised this is a medical document. Medical documents are intended to carry relevant information, facts as evident, and the clinical opinion of the practitioner. The medical note is intended as peer to peer communication and may appear blunt or direct. It is written in medical language and may contain abbreviations or verbiage that are unfamiliar.

## 2025-01-14 NOTE — PLAN OF CARE
Post-op day 1. Alert and oriented x4 on room air.  Denies numbness/tingling. PRN oxy and scheduled tylenol, tramadol, and toradol given for pain. L SCD and ASA for DVT prophylaxis. Not up yet, to be NWB to RLE. Voiding via urinal. Call light within reach.     Problem: Patient Centered Care  Goal: Patient preferences are identified and integrated in the patient's plan of care  Description: Interventions:  - What would you like us to know as we care for you?   - Provide timely, complete, and accurate information to patient/family  - Incorporate patient and family knowledge, values, beliefs, and cultural backgrounds into the planning and delivery of care  - Encourage patient/family to participate in care and decision-making at the level they choose  - Honor patient and family perspectives and choices  Outcome: Progressing     Problem: Patient/Family Goals  Goal: Patient/Family Long Term Goal  Description: Patient's Long Term Goal: discharge     Interventions:  - follow md orders  -monitor labs  -discharge planning  -update pt and family on plan of care  - See additional Care Plan goals for specific interventions  Outcome: Progressing  Goal: Patient/Family Short Term Goal  Description: Patient's Short Term Goal: pain management    Interventions:   - follow md orders  -take pain medication as needed  -non-pharmacologic therapy  -PT/OT  - See additional Care Plan goals for specific interventions  Outcome: Progressing     Problem: PAIN - ADULT  Goal: Verbalizes/displays adequate comfort level or patient's stated pain goal  Description: INTERVENTIONS:  - Encourage pt to monitor pain and request assistance  - Assess pain using appropriate pain scale  - Administer analgesics based on type and severity of pain and evaluate response  - Implement non-pharmacological measures as appropriate and evaluate response  - Consider cultural and social influences on pain and pain management  - Manage/alleviate anxiety  - Utilize  distraction and/or relaxation techniques  - Monitor for opioid side effects  - Notify MD/LIP if interventions unsuccessful or patient reports new pain  - Anticipate increased pain with activity and pre-medicate as appropriate  Outcome: Progressing     Problem: RISK FOR INFECTION - ADULT  Goal: Absence of fever/infection during anticipated neutropenic period  Description: INTERVENTIONS  - Monitor WBC  - Administer growth factors as ordered  - Implement neutropenic guidelines  Outcome: Progressing     Problem: SAFETY ADULT - FALL  Goal: Free from fall injury  Description: INTERVENTIONS:  - Assess pt frequently for physical needs  - Identify cognitive and physical deficits and behaviors that affect risk of falls.  - Amidon fall precautions as indicated by assessment.  - Educate pt/family on patient safety including physical limitations  - Instruct pt to call for assistance with activity based on assessment  - Modify environment to reduce risk of injury  - Provide assistive devices as appropriate  - Consider OT/PT consult to assist with strengthening/mobility  - Encourage toileting schedule  Outcome: Progressing     Problem: DISCHARGE PLANNING  Goal: Discharge to home or other facility with appropriate resources  Description: INTERVENTIONS:  - Identify barriers to discharge w/pt and caregiver  - Include patient/family/discharge partner in discharge planning  - Arrange for needed discharge resources and transportation as appropriate  - Identify discharge learning needs (meds, wound care, etc)  - Arrange for interpreters to assist at discharge as needed  - Consider post-discharge preferences of patient/family/discharge partner  - Complete POLST form as appropriate  - Assess patient's ability to be responsible for managing their own health  - Refer to Case Management Department for coordinating discharge planning if the patient needs post-hospital services based on physician/LIP order or complex needs related to  functional status, cognitive ability or social support system  Outcome: Progressing

## 2025-01-14 NOTE — OPERATIVE REPORT
[facility]  OPERATIVE REPORT     DATE OF SURGERY: 1/13/2025     PREOPERATIVE DIAGNOSIS:   Closed right ibial shaft fracture with intra articular extension, posterior malleolar fragment  Closed right proximal fibular fracture        POST-OPERATIVE DIAGNOSIS:   Closed right ibial shaft fracture with intra articular extension, posterior malleolar fragment  Closed right proximal fibular shaft fracture     PROCEDURE:  Closed reduction and intramedullary nailing of right tibial shaft fracture with intra-articular extension  Percutaneous fixation of right nondisplaced posterior malleolar intra-articular fracture.   Closed treatment of right proximal fibular shaft fracture  Intra-operative interpretation of fluoroscopy     Location: [facility]     SURGEON: Omar A Behery, MD  Assistant(s): Yudith Patricia CSA     Anesthesia type: General  Estimated Blood Loss: 200cc     Drains: None     Specimen: none     Findings: displaced spiral communicated distal tibial shaft fracture.      Complications: None immediately apparent     Implants:  Ronn 11mm x 390mm T2 alpha tibial nail. 2 proximal interlocks, 3 distal interlock (two advanced locking medial to lateral)   Ronn 4.0 ASNIS partially threaded screw        Indication:      This is a 59 year old man who presented after a mechanical fall and sustained a closed significantly displaced distal 1/3 tibial shaft fracture with associated proximal fibular shaft fracture. She was seen in the ER, and splinted. On CT imaging there persistent tibial shaft fracture displacement and there was an intra-articular distal tibial split in the coronal plane representing a posterior malleolar fragment. The two treatment options of operative versus non-operative were discussed with the patient pre-operatively, and a shared decision was made to proceed with operative repair of the tibial and posterior malleolar fractures. All risks and benefits of the surgery were discussed  with the patient who provided informed consent.      Procedure in detail:     The patient was brought to the operating room and underwent general anesthesia. The patient was placed in a supine position, with an ipsilateral hip bump. The operative thigh was sterilely prepped circumferentially and draped in a usual sterile fashion.  A bone foam positioner was placed under the left lower extremity.     A surgical pause was conducted to reconfirm the correct patient, site, side, and procedure to be performed.  Perioperative antibiotics were given within one hour prior to the procedure.       A percutaneous incision was made over the distal tibia, and a guidewire for a 4.0 ASNIS screw was inserted from anterior to posterior in the distal aspect of the tibia to stabilize the intra-articular split / posterior malleolar fracture, using fluoroscopic guidance.     A 4cm incision was made proximal to the patella, and cautery was used to dissect down to the quadriceps tendon which was split longitudinally. The suprapatellar instrumentation cannula was inserted into the patellofemoral joint and a guidewire was inserted in the appropriate start point on AP and lateral views. The opening reamer was used to create an entry into the proximal tibia. A ball tip wire was inserted and advanced to the level of the fracture and the fracture which was reduced with traction, and percutaneous pointed reduction clamp application. The ball tip wire was advanced into the distal tibia and measured to be ~403mm. We elected to use a 390mm length nail. Prior to reaming, the first guidewire for a 4.0 screw was measured over drilled and a screw was placed to prevent displacement of the intra-articular split. The canal was then reamed to a diameter of 12mm, and an 11mm x 390mm nail was inserted to the appropriate depth. The ball tip wire was removed. Two proximal medial to lateral static interlocking screws were drilled measured and and placed.  Distally, 3 interlocks were drilled, measured and placed using fluoroscopic perfect circles technique.  The clamp and both distal guidwires were removed. Final fluoroscopic imaging was obtained confirming appropriate fracture alignment, length and rotation as well as implant position.       The wounds were irrigated copiously and the incisions were closed using #1 Vicryl for the quadriceps tendon, 2-0 monocryl for subcutaneous tissues and 3-0 nylon suture for the skin.      Xeroform, gauze, webril were applied over the incision, and a plaster, short leg [posterior slab splint was applied.       There were no immediate complications.      I was present and scrubbed throughout the entire surgery.      COMPLICATIONS: None     POST-OPERATIVE PLAN     The patient will receive post-operative antibiotics (ancef) for 24 hours as surgical prophylaxis.    VTE prophylaxis will consist of early mobilization, mechanical compressive devices, and ASA 81 BID if not medically contra-indicated.  The patient will be non-weight bearing for a period of 6 weeks given ankle fracture, but split will be removed in 2 weeks for suture removal and placement into a cam boot to allow periodic removal for ankle ROM exercises.

## 2025-01-14 NOTE — ANESTHESIA POSTPROCEDURE EVALUATION
Patient: Shaw Altman    Procedure Summary       Date: 01/13/25 Room / Location: [facility] MAIN OR  / [facility] MAIN OR    Anesthesia Start: 1724 Anesthesia Stop:     Procedure: RIGHT TIBIA INTRAMEDULLARY NAILING (Right: Lower Leg) Diagnosis: (right leg fracture)    Surgeons: Behery, Omar Atef, MD Anesthesiologist: Bobbi Galicia MD    Anesthesia Type: general ASA Status: 1 - Emergent            Anesthesia Type: general    Vitals Value Taken Time   /97 01/13/25 1927   Temp 97.9 °F (36.6 °C) 01/13/25 1926   Pulse 78 01/13/25 1928   Resp 11 01/13/25 1928   SpO2 92 % 01/13/25 1928   Vitals shown include unfiled device data.    [facility] AN Post Evaluation:   Patient Evaluated in PACU  Patient Participation: complete - patient participated  Level of Consciousness: awake  Pain Score: 5  Pain Management: adequate  Airway Patency:patent  Dental exam unchanged from preop  Yes    Cardiovascular Status: acceptable  Respiratory Status: acceptable  Postoperative Hydration acceptable      BOBBI GALICIA MD  1/13/2025 7:28 PM

## 2025-01-14 NOTE — PHYSICAL THERAPY NOTE
PHYSICAL THERAPY EVALUATION - INPATIENT     Room Number: 410/410-A  Evaluation Date: 1/14/2025  Type of Evaluation: Initial   Physician Order: PT Eval and Treat    Presenting Problem: fall, distal tibia fracture, s/p R tibia IM nailing     Reason for Therapy: Mobility Dysfunction and Discharge Planning    PHYSICAL THERAPY ASSESSMENT   Patient is a 59 year old male admitted 1/11/2025 for fall, distal tibia fracture, s/p R tibia IM nailing. Prior to admission, patient's baseline is Independent with ADLs/iADLs/functional mobility. Patient is currently functioning below baseline with bed mobility, transfers, gait, stair negotiation, standing prolonged periods, and performing household tasks.  Patient is requiring stand-by assist and contact guard assist as a result of the following impairments: decreased functional strength, decreased endurance/aerobic capacity, pain, impaired standing balance, decreased muscular endurance, medical status, and limited RLE ROM.  Physical Therapy will continue to follow for duration of hospitalization.    Patient will benefit from continued skilled PT Services with no additional skilled services but increased support at home. Patient may benefit from OP PT when medically cleared by surgical team.    PLAN DURING HOSPITALIZATION  Nursing Mobility Recommendation : 1 Assist  PT Device Recommendation: Rolling walker  PT Treatment Plan: Bed mobility;Body mechanics;Endurance;Energy conservation;Patient education;Gait training;Strengthening;Stair training;Transfer training;Balance training  Rehab Potential : Good  Frequency (Obs): Daily     PHYSICAL THERAPY MEDICAL/SOCIAL HISTORY     Problem List  Principal Problem:    Leg fracture, right, closed, initial encounter    HOME SITUATION  Type of Home: House  Home Layout: Multi-level  Stairs to Enter : 4   Railing: Yes    Lives With: Spouse    Drives: Yes   Patient Regularly Uses: Glasses (owns rolling walker and crutches)     SUBJECTIVE  Agreeable      PHYSICAL THERAPY EXAMINATION   OBJECTIVE  Precautions: Limb alert - right  Fall Risk: Standard fall risk    WEIGHT BEARING RESTRICTION  R Lower Extremity: Non-Weight Bearing    PAIN ASSESSMENT  Rating: Unable to rate  Location: RLE  Management Techniques: Activity promotion;Body mechanics;Repositioning    COGNITION  Overall Cognitive Status:  WFL - within functional limits    RANGE OF MOTION AND STRENGTH ASSESSMENT  Upper extremity ROM and strength are within functional limits   Lower extremity ROM is within functional limits; RLE limited due to splint   Lower extremity strength is within functional limits; RLE not formally assessed due to recent procedure      BALANCE  Static Sitting: Good  Dynamic Sitting: Fair +  Static Standing: Fair  Dynamic Standing: Fair -    ACTIVITY TOLERANCE  Pulse: 103 (with activity)  Heart Rate Source: Monitor    AM-PAC '6-Clicks' INPATIENT SHORT FORM - BASIC MOBILITY  How much difficulty does the patient currently have...  Patient Difficulty: Turning over in bed (including adjusting bedclothes, sheets and blankets)?: A Little   Patient Difficulty: Sitting down on and standing up from a chair with arms (e.g., wheelchair, bedside commode, etc.): A Little   Patient Difficulty: Moving from lying on back to sitting on the side of the bed?: A Little   How much help from another person does the patient currently need...   Help from Another: Moving to and from a bed to a chair (including a wheelchair)?: A Little   Help from Another: Need to walk in hospital room?: A Little   Help from Another: Climbing 3-5 steps with a railing?: A Little     AM-PAC Score:  Raw Score: 18   Approx Degree of Impairment: 46.58%   Standardized Score (AM-PAC Scale): 43.63   CMS Modifier (G-Code): CK    FUNCTIONAL ABILITY STATUS  Functional Mobility/Gait Assessment  Gait Assistance:  (SBA)  Distance (ft): 40 ft  Assistive Device: Rolling walker  Pattern:  (decreased gerard speed, L foot hopping pattern)  Supine  to Sit: stand-by assist  Sit to Stand: contact guard assist progressing to stand-by assist with increased trials from EOB and bedside chair; cues provided for appropriate RLE and UE placement during transfers in order to maintain compliance with NWB RLE status    Exercise/Education Provided:  Bed mobility  Body mechanics  Energy conservation  Functional activity tolerated  Gait training  Posture  Transfer training    Skilled Therapy Provided: Patient in bed with wife present in room upon arrival. RN approved activity. Educated patient on POC and benefits of mobilization. Agreeable to participate. Patient reporting RLE pain, not quantified per the pain scale. Educated patient on RLE NWB status and appropriate body mechanics in order to maintain compliance with weight bearing status throughout. Discussed use of hand railing and one crutch for stair negotiation - patient declining need to trial stairs at this time. Discussed knee scooter and waiting for surgical team clearance on usage.     The patient's Approx Degree of Impairment: 46.58% has been calculated based on documentation in the [facility] '6 clicks' Inpatient Basic Mobility Short Form.  Research supports that patients with this level of impairment may benefit from OPPT when medically cleared.  Final disposition will be made by interdisciplinary medical team.    Patient End of Session: Up in chair;Needs met;Call light within reach;RN aware of session/findings;All patient questions and concerns addressed;Hospital anti-slip socks;Family present    CURRENT GOALS  Goals to be met by: 1/21/25  Patient Goal Patient's self-stated goal is: go home   Goal #1 Patient is able to demonstrate supine - sit EOB @ level: modified independent     Goal #1   Current Status    Goal #2 Patient is able to demonstrate transfers Sit to/from Stand at assistance level: modified independent with walker - rolling while maintaining RLE NWB status     Goal #2  Current Status    Goal #3 Patient  is able to ambulate 75 feet with assist device: walker - rolling at assistance level: supervision while maintaining RLE NWB status   Goal #3   Current Status    Goal #4 Patient will negotiate 4 stairs/one curb w/ assistive device and SBA while maintaining RLE NWB status   Goal #4   Current Status    Goal #5 Patient to demonstrate independence with home activity/exercise instructions provided to patient in preparation for discharge.   Goal #5   Current Status      Patient Evaluation Complexity Level:  History Moderate - 1 or 2 personal factors and/or co-morbidities   Examination of body systems Moderate - addressing a total of 3 or more elements   Clinical Presentation  Moderate - Evolving   Clinical Decision Making  Moderate Complexity     Gait Training: 15 minutes

## 2025-01-15 ENCOUNTER — PATIENT OUTREACH (OUTPATIENT)
Dept: CASE MANAGEMENT | Age: 60
End: 2025-01-15

## 2025-01-15 NOTE — PROGRESS NOTES
Accompanied by Mom Danyell    Parental Concerns None    Water Source private well    Second Hand Smoke Exposure No    Pets Yes    Parental Hearing Concerns No    Parental Vision Concerns No    Dentist Yes    Cholesterol/Heart Risk Low Risk    TB Risk  Low Risk  Child Born Outside of [place] No  Contact with anyone with TB No  Contact with anyone with positive PPD No    Vision Sees eye dr- glasses & contacts    Hearing Passed       Health Maintenance       Influenza Vaccine (1)  Overdue since 9/1/2024    COVID-19 Vaccine (1 - 2024-25 season)  Never done    Depression Screening (Yearly)  Due since 12/8/2024    Annual Physical (ages 3 - 21) (Yearly)  Due since 12/8/2024           Following review of the above:  Patient is not proceeding with: COVID-19 and Influenza    Note: Refer to final orders and clinician documentation.             Hospital Follow up for PCP / Specialist (Discharge 1/14 elm)     PCP   Silvestre Lopez   [address] [facility] [id number] [phone]  No access; office to contact pt to schedule   Orthopedic   Behery, Omar Atef   [address] [facility]  [address] 1st [place] [address]  [phone]  Appt made for 1/21 @4pm   Confirmed with pt   Closing encounter

## (undated) DEVICE — TOWEL,OR,DSP,ST,BLUE,DLX,2/PK,40PK/CS: Brand: MEDLINE

## (undated) DEVICE — BNDG,ELSTC,MATRIX,STRL,4"X5YD,LF,HOOK&LP: Brand: MEDLINE

## (undated) DEVICE — OCCLUSIVE GAUZE STRIP,3% BISMUTH TRIBROMOPHENATE IN PETROLATUM BLEND: Brand: XEROFORM

## (undated) DEVICE — SOLUTION IRRIG 1000ML 0.9% NACL USP BTL

## (undated) DEVICE — FREEHAND DRILL

## (undated) DEVICE — SUT COAT VCRL 2-0 27IN ABSRB UD 26MM CT-2

## (undated) DEVICE — GUIDE WIRE, BALL-TIPPED, STERILE

## (undated) DEVICE — PACK CDS LOWER EXTREMITY

## (undated) DEVICE — LOCKING DRILL

## (undated) DEVICE — SUT MCRYL 2-0 36IN CT-1 ABSRB UD L36MM TAPR P

## (undated) DEVICE — SPLINT ONESTEP 5X30IN FBRGLS MOLD

## (undated) DEVICE — DISPOSABLE TOURNIQUET CUFF SINGLE BLADDER, DUAL PORT AND QUICK CONNECT CONNECTOR: Brand: COLOR CUFF

## (undated) DEVICE — REAMER SHAFT, MOD.TRINKLE: Brand: BIXCUT

## (undated) DEVICE — SPONGE LAP 18X18IN WHT COT 4 PLY FLD STRUNG

## (undated) DEVICE — COTTON UNDERCAST PADDING,REGULAR FINISH: Brand: WEBRIL

## (undated) DEVICE — SUT COAT VCRL+ 1 18IN CTX ABSRB VLT ANTIBACT

## (undated) DEVICE — SPONGE 4X4 10PK

## (undated) DEVICE — C-ARM: Brand: UNBRANDED

## (undated) DEVICE — C-ARMOR C-ARM EQUIPMENT COVERS CLEAR STERILE UNIVERSAL FIT 12 PER CASE: Brand: C-ARMOR

## (undated) DEVICE — SUT ETHLN 3-0 30IN FS-1 NABSRB BLK 24MM 3/8 C

## (undated) DEVICE — APPLICATOR PREP 26ML CHG 2% ISO ALC 70%

## (undated) DEVICE — LOCKING SCALPEL: Brand: T2 ALPHA

## (undated) DEVICE — BANDAGE CAST 4INX5YD HT PLSTR X FAST SET

## (undated) DEVICE — TETRA-FLEX CF WOVEN LATEX FREE ELASTIC BANDAGE 4" X 5.5 YD: Brand: TETRA-FLEX™CF

## (undated) DEVICE — FIXATION K-WIRE SPI, WCH COATED: Brand: T2

## (undated) DEVICE — GAMMEX® PI HYBRID SIZE 7, STERILE POWDER-FREE SURGICAL GLOVE, POLYISOPRENE AND NEOPRENE BLEND: Brand: GAMMEX

## (undated) DEVICE — GAMMEX® NON-LATEX PI ORTHO SIZE 8.5, STERILE POLYISOPRENE POWDER-FREE SURGICAL GLOVE: Brand: GAMMEX

## (undated) DEVICE — NAIL INSERTION SLEEVE, ELASTIC SPI DIAM.8-13: Brand: T2

## (undated) DEVICE — ENCORE® LATEX MICRO SIZE 8.5, STERILE LATEX POWDER-FREE SURGICAL GLOVE: Brand: ENCORE

## (undated) DEVICE — CANNULATED DRILL

## (undated) DEVICE — SUCTION CANISTER, 3000CC,SAFELINER: Brand: DEROYAL

## (undated) DEVICE — 3M™ IOBAN™ 2 ANTIMICROBIAL INCISE DRAPE 6650EZ: Brand: IOBAN™ 2

## (undated) DEVICE — INTENDED FOR TISSUE SEPARATION, AND OTHER PROCEDURES THAT REQUIRE A SHARP SURGICAL BLADE TO PUNCTURE OR CUT.: Brand: BARD-PARKER ® STAINLESS STEEL BLADES

## (undated) NOTE — LETTER
Julissa Pepper, [address]  [facility], [facility]       12/20/17        Patient: Alicia Acevedo   YOB: 1965   Date of Visit: 12/20/2017       Dear  Dr. Ana Rocha MD,      Thank you for referring Alicia Acevedo to my prac

## (undated) NOTE — LETTER
Hospital Discharge Documentation    From: [facility] Hospitalist's Office  Phone: [phone]    Patient discharged time/date: 2025  1:26 PM  Patient discharge disposition:  Home or Self Care       Discharge Summary - D/C Summary        Discharge Summary signed by Balta Gonzalez MD at 2025 12:19 PM  Version 1 of 1      Author: Balta Gonzalez MD Service: Hospitalist Author Type: Physician    Filed: 2025 12:19 PM Date of Service: 2025 12:18 PM Status: Signed    : Balta Gonzalez MD (Physician)           [facility]  [facility]    DISCHARGE SUMMARY     Shaw Altman Patient Status:  Observation    1965 MRN [id number]   Location [facility] 4W/SW/SE Attending Balta Gonzalez MD   Hosp Day # 0 PCP Silvestre Lopez MD     Date of Admission: 2025  Date of Discharge:  2025    Discharge Disposition: Final discharge disposition not confirmed    Discharge Diagnosis:     R tibial fracture    History of Present Illness:     Shaw Altman is a 59 year old male presented ot the ER after he slipped on ice. He denied any loss of consciousness or injury to any other bony prominence.   He is currently waiting to speak with ortho.   He denied any fever, chills, sob, n/v/d or any other complaints.     Brief Synopsis:     Patient tolerated surgery well. Cleared for discharge by therapy.   Patient will follow up with PCP and Ortho as opt.    Patient is to remain compliant with all discharge medications and instructions and to follow up as advised.   Patient encouraged to make healthy lifestyle and dietary changes.    Lace+ Score: 32  59-90 High Risk  29-58 Medium Risk  0-28   Low Risk       TCM Follow-Up Recommendation:  LACE 29-58: Moderate Risk of readmission after discharge from the hospital.      Procedures during hospitalization:   IM nailing    Incidental or significant findings and recommendations (brief descriptions):  None    Lab/Test results  pending at Discharge:   None    Consultants:  Ortho    Discharge Medication List:     Discharge Medications        START taking these medications        Instructions Prescription details   HYDROcodone-acetaminophen 5-325 MG Tabs  Commonly known as: Norco      Take 1 tablet by mouth every 6 (six) hours as needed.   Stop taking on: January 16, 2025  Quantity: 20 tablet  Refills: 0               Where to Get Your Medications        These medications were sent to [id number] - [place], [place] - [address] [phone], [phone]  [address] [place] [address]      Phone: [phone]   HYDROcodone-acetaminophen 5-325 MG Tabs         [place]    Follow-up appointment:   Orlando Sevilla MD  [address] [place] [address] [address] [place] [address] [id number] [phone]    Follow up      Silvestre Lopez MD  [address] [facility] [id number] [phone]    Follow up in 1 week(s)      Appointments for Next 30 Days 1/14/2025 - 2/13/2025      None            Vital signs:  Temp:  [96 °F (35.6 °C)-98.3 °F (36.8 °C)] 97.8 °F (36.6 °C)  Pulse:  [64-79] 76  Resp:  [10-18] 18  BP: (143-182)/() 157/84  SpO2:  [91 %-97 %] 94 %    Physical Exam:    Gen: NAD AO x3  Chest: good air entry CTABL  CVS: normal s1 and s2 RR  Abd: NABS soft NT ND  Neuro: CN 2-12 grossly intact  Ext: no edema in bilateral LE    -----------------------------------------------------------------------------------------------  PATIENT DISCHARGE INSTRUCTIONS: See electronic chart    Balta Gonzalez MD  Hospitalist    Time spent:  > 30 minutes    The 21st Century Cures Act makes medical notes like these available to patients in the interest of transparency. Please be advised this is a medical document. Medical documents are intended to carry relevant information, facts as evident, and the clinical opinion of the practitioner. The medical note is intended as peer to peer communication and may appear blunt or direct. It is written in  medical language and may contain abbreviations or verbiage that are unfamiliar.     Electronically signed by Balta Gonzalez MD on 1/14/2025 12:19 PM